# Patient Record
Sex: FEMALE | Race: WHITE | Employment: OTHER | URBAN - METROPOLITAN AREA
[De-identification: names, ages, dates, MRNs, and addresses within clinical notes are randomized per-mention and may not be internally consistent; named-entity substitution may affect disease eponyms.]

---

## 2017-02-08 ENCOUNTER — OFFICE VISIT (OUTPATIENT)
Dept: INTERNAL MEDICINE CLINIC | Age: 62
End: 2017-02-08

## 2017-02-08 VITALS
DIASTOLIC BLOOD PRESSURE: 84 MMHG | SYSTOLIC BLOOD PRESSURE: 130 MMHG | BODY MASS INDEX: 38.62 KG/M2 | HEART RATE: 72 BPM | WEIGHT: 218 LBS | HEIGHT: 63 IN

## 2017-02-08 DIAGNOSIS — Z23 NEED FOR PROPHYLACTIC VACCINATION AND INOCULATION AGAINST VARICELLA: ICD-10-CM

## 2017-02-08 DIAGNOSIS — Z79.899 HIGH RISK MEDICATION USE: ICD-10-CM

## 2017-02-08 DIAGNOSIS — E66.01 SEVERE OBESITY (BMI 35.0-39.9): ICD-10-CM

## 2017-02-08 DIAGNOSIS — F41.1 GAD (GENERALIZED ANXIETY DISORDER): ICD-10-CM

## 2017-02-08 DIAGNOSIS — Z11.4 SCREENING FOR HIV WITHOUT PRESENCE OF RISK FACTORS: ICD-10-CM

## 2017-02-08 DIAGNOSIS — Z00.00 PREVENTATIVE HEALTH CARE: Primary | ICD-10-CM

## 2017-02-08 DIAGNOSIS — I10 ESSENTIAL HYPERTENSION: ICD-10-CM

## 2017-02-08 DIAGNOSIS — K21.9 GASTROESOPHAGEAL REFLUX DISEASE, ESOPHAGITIS PRESENCE NOT SPECIFIED: ICD-10-CM

## 2017-02-08 DIAGNOSIS — Z11.59 NEED FOR HEPATITIS C SCREENING TEST: ICD-10-CM

## 2017-02-08 DIAGNOSIS — Z85.3 HISTORY OF CANCER OF RIGHT BREAST: ICD-10-CM

## 2017-02-08 LAB
A/G RATIO: 1.5 (ref 1.1–2.2)
ALBUMIN SERPL-MCNC: 3.7 G/DL (ref 3.4–5)
ALP BLD-CCNC: 86 U/L (ref 40–129)
ALT SERPL-CCNC: 23 U/L (ref 10–40)
ANION GAP SERPL CALCULATED.3IONS-SCNC: 12 MMOL/L (ref 3–16)
AST SERPL-CCNC: 22 U/L (ref 15–37)
BASOPHILS ABSOLUTE: 0.1 K/UL (ref 0–0.2)
BASOPHILS RELATIVE PERCENT: 1.1 %
BILIRUB SERPL-MCNC: 0.5 MG/DL (ref 0–1)
BUN BLDV-MCNC: 12 MG/DL (ref 7–20)
CALCIUM SERPL-MCNC: 8.9 MG/DL (ref 8.3–10.6)
CHLORIDE BLD-SCNC: 98 MMOL/L (ref 99–110)
CHOLESTEROL, TOTAL: 175 MG/DL (ref 0–199)
CO2: 31 MMOL/L (ref 21–32)
CREAT SERPL-MCNC: 0.9 MG/DL (ref 0.6–1.2)
EOSINOPHILS ABSOLUTE: 0.2 K/UL (ref 0–0.6)
EOSINOPHILS RELATIVE PERCENT: 3.2 %
GFR AFRICAN AMERICAN: >60
GFR NON-AFRICAN AMERICAN: >60
GLOBULIN: 2.5 G/DL
GLUCOSE BLD-MCNC: 99 MG/DL (ref 70–99)
HCT VFR BLD CALC: 43.4 % (ref 36–48)
HDLC SERPL-MCNC: 47 MG/DL (ref 40–60)
HEMOGLOBIN: 14.4 G/DL (ref 12–16)
HEPATITIS C ANTIBODY INTERPRETATION: NORMAL
LDL CHOLESTEROL CALCULATED: 106 MG/DL
LYMPHOCYTES ABSOLUTE: 2.7 K/UL (ref 1–5.1)
LYMPHOCYTES RELATIVE PERCENT: 39.3 %
MAGNESIUM: 2.1 MG/DL (ref 1.8–2.4)
MCH RBC QN AUTO: 28.6 PG (ref 26–34)
MCHC RBC AUTO-ENTMCNC: 33.3 G/DL (ref 31–36)
MCV RBC AUTO: 86 FL (ref 80–100)
MONOCYTES ABSOLUTE: 0.9 K/UL (ref 0–1.3)
MONOCYTES RELATIVE PERCENT: 12.6 %
NEUTROPHILS ABSOLUTE: 3 K/UL (ref 1.7–7.7)
NEUTROPHILS RELATIVE PERCENT: 43.8 %
PDW BLD-RTO: 13.3 % (ref 12.4–15.4)
PLATELET # BLD: 208 K/UL (ref 135–450)
PMV BLD AUTO: 8.4 FL (ref 5–10.5)
POTASSIUM SERPL-SCNC: 3.8 MMOL/L (ref 3.5–5.1)
RBC # BLD: 5.05 M/UL (ref 4–5.2)
SODIUM BLD-SCNC: 141 MMOL/L (ref 136–145)
TOTAL PROTEIN: 6.2 G/DL (ref 6.4–8.2)
TRIGL SERPL-MCNC: 110 MG/DL (ref 0–150)
TSH REFLEX: 2.71 UIU/ML (ref 0.27–4.2)
VITAMIN B-12: 602 PG/ML (ref 211–911)
VLDLC SERPL CALC-MCNC: 22 MG/DL
WBC # BLD: 6.9 K/UL (ref 4–11)

## 2017-02-08 PROCEDURE — 99396 PREV VISIT EST AGE 40-64: CPT | Performed by: FAMILY MEDICINE

## 2017-02-08 RX ORDER — RANITIDINE 150 MG/1
TABLET ORAL
Qty: 90 TABLET | Refills: 3 | Status: SHIPPED | OUTPATIENT
Start: 2017-02-08 | End: 2017-05-10 | Stop reason: SDUPTHER

## 2017-02-08 RX ORDER — ESCITALOPRAM OXALATE 20 MG/1
TABLET ORAL
Qty: 90 TABLET | Refills: 1 | Status: SHIPPED | OUTPATIENT
Start: 2017-02-08 | End: 2017-05-10 | Stop reason: SDUPTHER

## 2017-02-08 RX ORDER — LOSARTAN POTASSIUM AND HYDROCHLOROTHIAZIDE 25; 100 MG/1; MG/1
TABLET ORAL
Qty: 90 TABLET | Refills: 1 | Status: SHIPPED | OUTPATIENT
Start: 2017-02-08 | End: 2017-07-28 | Stop reason: SDUPTHER

## 2017-02-08 RX ORDER — PANTOPRAZOLE SODIUM 40 MG/1
TABLET, DELAYED RELEASE ORAL
Qty: 90 TABLET | Refills: 1 | Status: SHIPPED | OUTPATIENT
Start: 2017-02-08 | End: 2017-07-06 | Stop reason: SDUPTHER

## 2017-02-09 LAB — HIV-1 AND HIV-2 ANTIBODIES: NORMAL

## 2017-03-06 ENCOUNTER — HOSPITAL ENCOUNTER (OUTPATIENT)
Dept: MAMMOGRAPHY | Age: 62
Discharge: HOME | End: 2017-03-06
Attending: FAMILY MEDICINE | Admitting: FAMILY MEDICINE

## 2017-03-06 DIAGNOSIS — Z85.3 PERSONAL HISTORY OF MALIGNANT NEOPLASM OF BREAST: ICD-10-CM

## 2017-03-06 DIAGNOSIS — Z00.00 ENCOUNTER FOR GENERAL ADULT MEDICAL EXAMINATION WITHOUT ABNORMAL FINDINGS: ICD-10-CM

## 2017-05-10 DIAGNOSIS — K21.9 GASTROESOPHAGEAL REFLUX DISEASE, ESOPHAGITIS PRESENCE NOT SPECIFIED: ICD-10-CM

## 2017-05-10 DIAGNOSIS — F41.1 GAD (GENERALIZED ANXIETY DISORDER): ICD-10-CM

## 2017-05-10 RX ORDER — RANITIDINE 150 MG/1
TABLET ORAL
Qty: 90 TABLET | Refills: 3 | Status: SHIPPED | OUTPATIENT
Start: 2017-05-10 | End: 2018-04-11 | Stop reason: SDUPTHER

## 2017-05-10 RX ORDER — ESCITALOPRAM OXALATE 20 MG/1
TABLET ORAL
Qty: 90 TABLET | Refills: 1 | Status: SHIPPED | OUTPATIENT
Start: 2017-05-10 | End: 2017-11-12 | Stop reason: SDUPTHER

## 2017-07-06 ENCOUNTER — TELEPHONE (OUTPATIENT)
Dept: INTERNAL MEDICINE CLINIC | Age: 62
End: 2017-07-06

## 2017-07-06 DIAGNOSIS — K21.9 GASTROESOPHAGEAL REFLUX DISEASE, ESOPHAGITIS PRESENCE NOT SPECIFIED: ICD-10-CM

## 2017-07-06 RX ORDER — PANTOPRAZOLE SODIUM 40 MG/1
TABLET, DELAYED RELEASE ORAL
Qty: 30 TABLET | Refills: 5 | Status: SHIPPED | OUTPATIENT
Start: 2017-07-06 | End: 2017-08-09 | Stop reason: SDUPTHER

## 2017-07-28 DIAGNOSIS — I10 ESSENTIAL HYPERTENSION: ICD-10-CM

## 2017-07-28 RX ORDER — LOSARTAN POTASSIUM AND HYDROCHLOROTHIAZIDE 25; 100 MG/1; MG/1
TABLET ORAL
Qty: 90 TABLET | Refills: 1 | Status: SHIPPED | OUTPATIENT
Start: 2017-07-28 | End: 2018-02-01 | Stop reason: SDUPTHER

## 2017-08-09 ENCOUNTER — OFFICE VISIT (OUTPATIENT)
Dept: INTERNAL MEDICINE CLINIC | Age: 62
End: 2017-08-09

## 2017-08-09 VITALS
SYSTOLIC BLOOD PRESSURE: 130 MMHG | BODY MASS INDEX: 38.27 KG/M2 | HEIGHT: 63 IN | WEIGHT: 216 LBS | HEART RATE: 80 BPM | DIASTOLIC BLOOD PRESSURE: 82 MMHG

## 2017-08-09 DIAGNOSIS — E66.01 SEVERE OBESITY (BMI 35.0-39.9): ICD-10-CM

## 2017-08-09 DIAGNOSIS — F41.1 GAD (GENERALIZED ANXIETY DISORDER): ICD-10-CM

## 2017-08-09 DIAGNOSIS — I10 ESSENTIAL HYPERTENSION: Primary | ICD-10-CM

## 2017-08-09 DIAGNOSIS — K21.9 GASTROESOPHAGEAL REFLUX DISEASE, ESOPHAGITIS PRESENCE NOT SPECIFIED: ICD-10-CM

## 2017-08-09 PROCEDURE — 99214 OFFICE O/P EST MOD 30 MIN: CPT | Performed by: FAMILY MEDICINE

## 2017-08-09 RX ORDER — PANTOPRAZOLE SODIUM 40 MG/1
TABLET, DELAYED RELEASE ORAL
Qty: 90 TABLET | Refills: 5 | Status: SHIPPED | OUTPATIENT
Start: 2017-08-09 | End: 2018-04-11 | Stop reason: SDUPTHER

## 2017-08-09 ASSESSMENT — ENCOUNTER SYMPTOMS
SHORTNESS OF BREATH: 0
DIARRHEA: 0
CONSTIPATION: 0
ABDOMINAL DISTENTION: 1
WHEEZING: 0
COUGH: 0
CHEST TIGHTNESS: 0

## 2017-08-09 ASSESSMENT — PATIENT HEALTH QUESTIONNAIRE - PHQ9
SUM OF ALL RESPONSES TO PHQ QUESTIONS 1-9: 0
2. FEELING DOWN, DEPRESSED OR HOPELESS: 0
1. LITTLE INTEREST OR PLEASURE IN DOING THINGS: 0
SUM OF ALL RESPONSES TO PHQ9 QUESTIONS 1 & 2: 0

## 2017-11-12 DIAGNOSIS — F41.1 GAD (GENERALIZED ANXIETY DISORDER): ICD-10-CM

## 2017-11-13 RX ORDER — ESCITALOPRAM OXALATE 20 MG/1
TABLET ORAL
Qty: 30 TABLET | Refills: 0 | Status: SHIPPED | OUTPATIENT
Start: 2017-11-13 | End: 2017-12-17 | Stop reason: SDUPTHER

## 2017-12-17 DIAGNOSIS — F41.1 GAD (GENERALIZED ANXIETY DISORDER): ICD-10-CM

## 2017-12-18 RX ORDER — ESCITALOPRAM OXALATE 20 MG/1
TABLET ORAL
Qty: 30 TABLET | Refills: 0 | Status: SHIPPED | OUTPATIENT
Start: 2017-12-18 | End: 2018-01-18 | Stop reason: SDUPTHER

## 2018-01-18 DIAGNOSIS — F41.1 GAD (GENERALIZED ANXIETY DISORDER): ICD-10-CM

## 2018-01-18 RX ORDER — ESCITALOPRAM OXALATE 20 MG/1
TABLET ORAL
Qty: 30 TABLET | Refills: 0 | Status: SHIPPED | OUTPATIENT
Start: 2018-01-18 | End: 2018-02-14 | Stop reason: SDUPTHER

## 2018-02-01 DIAGNOSIS — I10 ESSENTIAL HYPERTENSION: ICD-10-CM

## 2018-02-01 RX ORDER — LOSARTAN POTASSIUM AND HYDROCHLOROTHIAZIDE 25; 100 MG/1; MG/1
TABLET ORAL
Qty: 30 TABLET | Refills: 0 | Status: SHIPPED | OUTPATIENT
Start: 2018-02-01 | End: 2018-04-11 | Stop reason: SDUPTHER

## 2018-03-19 DIAGNOSIS — F41.1 GAD (GENERALIZED ANXIETY DISORDER): ICD-10-CM

## 2018-03-19 RX ORDER — ESCITALOPRAM OXALATE 20 MG/1
TABLET ORAL
Qty: 30 TABLET | Refills: 0 | Status: SHIPPED | OUTPATIENT
Start: 2018-03-19 | End: 2018-04-11 | Stop reason: SDUPTHER

## 2018-04-11 ENCOUNTER — OFFICE VISIT (OUTPATIENT)
Dept: INTERNAL MEDICINE CLINIC | Age: 63
End: 2018-04-11

## 2018-04-11 VITALS
SYSTOLIC BLOOD PRESSURE: 126 MMHG | HEART RATE: 64 BPM | HEIGHT: 63 IN | DIASTOLIC BLOOD PRESSURE: 84 MMHG | WEIGHT: 219 LBS | BODY MASS INDEX: 38.8 KG/M2

## 2018-04-11 DIAGNOSIS — F41.1 GAD (GENERALIZED ANXIETY DISORDER): ICD-10-CM

## 2018-04-11 DIAGNOSIS — Z85.3 HISTORY OF CANCER OF RIGHT BREAST: ICD-10-CM

## 2018-04-11 DIAGNOSIS — Z00.00 ROUTINE PHYSICAL EXAMINATION: Primary | ICD-10-CM

## 2018-04-11 DIAGNOSIS — K21.9 GASTROESOPHAGEAL REFLUX DISEASE, ESOPHAGITIS PRESENCE NOT SPECIFIED: ICD-10-CM

## 2018-04-11 DIAGNOSIS — Z12.31 SCREENING MAMMOGRAM, ENCOUNTER FOR: ICD-10-CM

## 2018-04-11 DIAGNOSIS — Z79.899 HIGH RISK MEDICATION USE: ICD-10-CM

## 2018-04-11 DIAGNOSIS — I10 ESSENTIAL HYPERTENSION: ICD-10-CM

## 2018-04-11 DIAGNOSIS — Z23 NEED FOR SHINGLES VACCINE: ICD-10-CM

## 2018-04-11 DIAGNOSIS — Z87.891 PERSONAL HISTORY OF TOBACCO USE: ICD-10-CM

## 2018-04-11 LAB
A/G RATIO: 1.6 (ref 1.1–2.2)
ALBUMIN SERPL-MCNC: 4.1 G/DL (ref 3.4–5)
ALP BLD-CCNC: 88 U/L (ref 40–129)
ALT SERPL-CCNC: 20 U/L (ref 10–40)
ANION GAP SERPL CALCULATED.3IONS-SCNC: 13 MMOL/L (ref 3–16)
AST SERPL-CCNC: 19 U/L (ref 15–37)
BASOPHILS ABSOLUTE: 0 K/UL (ref 0–0.2)
BASOPHILS RELATIVE PERCENT: 0.6 %
BILIRUB SERPL-MCNC: 0.6 MG/DL (ref 0–1)
BUN BLDV-MCNC: 16 MG/DL (ref 7–20)
CALCIUM SERPL-MCNC: 9.2 MG/DL (ref 8.3–10.6)
CHLORIDE BLD-SCNC: 95 MMOL/L (ref 99–110)
CHOLESTEROL, TOTAL: 188 MG/DL (ref 0–199)
CO2: 31 MMOL/L (ref 21–32)
CREAT SERPL-MCNC: 0.8 MG/DL (ref 0.6–1.2)
EOSINOPHILS ABSOLUTE: 0.2 K/UL (ref 0–0.6)
EOSINOPHILS RELATIVE PERCENT: 3.4 %
GFR AFRICAN AMERICAN: >60
GFR NON-AFRICAN AMERICAN: >60
GLOBULIN: 2.5 G/DL
GLUCOSE BLD-MCNC: 101 MG/DL (ref 70–99)
HCT VFR BLD CALC: 45.6 % (ref 36–48)
HDLC SERPL-MCNC: 56 MG/DL (ref 40–60)
HEMOGLOBIN: 15.4 G/DL (ref 12–16)
LDL CHOLESTEROL CALCULATED: 110 MG/DL
LYMPHOCYTES ABSOLUTE: 3 K/UL (ref 1–5.1)
LYMPHOCYTES RELATIVE PERCENT: 41.9 %
MAGNESIUM: 2.1 MG/DL (ref 1.8–2.4)
MCH RBC QN AUTO: 29.8 PG (ref 26–34)
MCHC RBC AUTO-ENTMCNC: 33.9 G/DL (ref 31–36)
MCV RBC AUTO: 88 FL (ref 80–100)
MONOCYTES ABSOLUTE: 0.7 K/UL (ref 0–1.3)
MONOCYTES RELATIVE PERCENT: 9.5 %
NEUTROPHILS ABSOLUTE: 3.2 K/UL (ref 1.7–7.7)
NEUTROPHILS RELATIVE PERCENT: 44.6 %
PDW BLD-RTO: 13.4 % (ref 12.4–15.4)
PLATELET # BLD: 269 K/UL (ref 135–450)
PMV BLD AUTO: 8.3 FL (ref 5–10.5)
POTASSIUM SERPL-SCNC: 4 MMOL/L (ref 3.5–5.1)
RBC # BLD: 5.18 M/UL (ref 4–5.2)
SODIUM BLD-SCNC: 139 MMOL/L (ref 136–145)
TOTAL PROTEIN: 6.6 G/DL (ref 6.4–8.2)
TRIGL SERPL-MCNC: 108 MG/DL (ref 0–150)
TSH REFLEX: 1.07 UIU/ML (ref 0.27–4.2)
VITAMIN B-12: 558 PG/ML (ref 211–911)
VLDLC SERPL CALC-MCNC: 22 MG/DL
WBC # BLD: 7.1 K/UL (ref 4–11)

## 2018-04-11 PROCEDURE — G0296 VISIT TO DETERM LDCT ELIG: HCPCS | Performed by: FAMILY MEDICINE

## 2018-04-11 PROCEDURE — 99396 PREV VISIT EST AGE 40-64: CPT | Performed by: FAMILY MEDICINE

## 2018-04-11 RX ORDER — PANTOPRAZOLE SODIUM 40 MG/1
TABLET, DELAYED RELEASE ORAL
Qty: 90 TABLET | Refills: 5 | Status: SHIPPED | OUTPATIENT
Start: 2018-04-11 | End: 2019-04-13 | Stop reason: SDUPTHER

## 2018-04-11 RX ORDER — LOSARTAN POTASSIUM AND HYDROCHLOROTHIAZIDE 25; 100 MG/1; MG/1
TABLET ORAL
Qty: 90 TABLET | Refills: 2 | Status: SHIPPED | OUTPATIENT
Start: 2018-04-11 | End: 2019-02-02 | Stop reason: SDUPTHER

## 2018-04-11 RX ORDER — RANITIDINE 150 MG/1
TABLET ORAL
Qty: 90 TABLET | Refills: 3 | Status: SHIPPED | OUTPATIENT
Start: 2018-04-11 | End: 2019-04-13 | Stop reason: SDUPTHER

## 2018-04-11 RX ORDER — ESCITALOPRAM OXALATE 20 MG/1
TABLET ORAL
Qty: 90 TABLET | Refills: 2 | Status: SHIPPED | OUTPATIENT
Start: 2018-04-11 | End: 2019-01-17 | Stop reason: SDUPTHER

## 2018-04-11 ASSESSMENT — PATIENT HEALTH QUESTIONNAIRE - PHQ9
SUM OF ALL RESPONSES TO PHQ QUESTIONS 1-9: 0
1. LITTLE INTEREST OR PLEASURE IN DOING THINGS: 0
SUM OF ALL RESPONSES TO PHQ9 QUESTIONS 1 & 2: 0
2. FEELING DOWN, DEPRESSED OR HOPELESS: 0

## 2018-04-17 ENCOUNTER — HOSPITAL ENCOUNTER (OUTPATIENT)
Dept: MAMMOGRAPHY | Age: 63
Discharge: HOME | End: 2018-04-17
Attending: FAMILY MEDICINE | Admitting: FAMILY MEDICINE

## 2018-04-17 DIAGNOSIS — Z12.31 SCREENING MAMMOGRAM, ENCOUNTER FOR: ICD-10-CM

## 2018-04-17 DIAGNOSIS — Z85.3 HISTORY OF CANCER OF RIGHT BREAST: ICD-10-CM

## 2018-05-30 ENCOUNTER — OFFICE VISIT (OUTPATIENT)
Dept: INTERNAL MEDICINE CLINIC | Age: 63
End: 2018-05-30

## 2018-05-30 ENCOUNTER — TELEPHONE (OUTPATIENT)
Dept: INTERNAL MEDICINE CLINIC | Age: 63
End: 2018-05-30

## 2018-05-30 VITALS — SYSTOLIC BLOOD PRESSURE: 126 MMHG | HEIGHT: 63 IN | HEART RATE: 68 BPM | DIASTOLIC BLOOD PRESSURE: 76 MMHG

## 2018-05-30 DIAGNOSIS — L28.2 PAPULAR URTICARIA: ICD-10-CM

## 2018-05-30 DIAGNOSIS — B35.6 TINEA CRURIS: Primary | ICD-10-CM

## 2018-05-30 PROCEDURE — 99213 OFFICE O/P EST LOW 20 MIN: CPT | Performed by: FAMILY MEDICINE

## 2018-05-30 RX ORDER — PREDNISONE 20 MG/1
40 TABLET ORAL DAILY
Qty: 10 TABLET | Refills: 1 | Status: SHIPPED | OUTPATIENT
Start: 2018-05-30 | End: 2018-06-04

## 2018-05-30 RX ORDER — CLOTRIMAZOLE AND BETAMETHASONE DIPROPIONATE 10; .64 MG/G; MG/G
CREAM TOPICAL
Qty: 15 G | Refills: 1 | Status: SHIPPED | OUTPATIENT
Start: 2018-05-30 | End: 2019-08-16 | Stop reason: ALTCHOICE

## 2018-05-30 ASSESSMENT — ENCOUNTER SYMPTOMS
COUGH: 0
WHEEZING: 0
SHORTNESS OF BREATH: 0
STRIDOR: 0

## 2018-06-13 ENCOUNTER — OFFICE VISIT (OUTPATIENT)
Dept: INTERNAL MEDICINE CLINIC | Age: 63
End: 2018-06-13

## 2018-06-13 VITALS
DIASTOLIC BLOOD PRESSURE: 82 MMHG | SYSTOLIC BLOOD PRESSURE: 132 MMHG | WEIGHT: 221 LBS | BODY MASS INDEX: 39.16 KG/M2 | HEART RATE: 76 BPM | HEIGHT: 63 IN

## 2018-06-13 DIAGNOSIS — Z01.419 ENCOUNTER FOR GYNECOLOGICAL EXAMINATION WITHOUT ABNORMAL FINDING: Primary | ICD-10-CM

## 2018-06-13 DIAGNOSIS — Z12.4 SCREENING FOR CERVICAL CANCER: ICD-10-CM

## 2018-06-13 PROCEDURE — 99396 PREV VISIT EST AGE 40-64: CPT | Performed by: FAMILY MEDICINE

## 2018-06-15 LAB
HPV COMMENT: NORMAL
HPV TYPE 16: NOT DETECTED
HPV TYPE 18: NOT DETECTED
HPVOH (OTHER TYPES): NOT DETECTED

## 2018-08-01 ENCOUNTER — OFFICE VISIT (OUTPATIENT)
Dept: INTERNAL MEDICINE CLINIC | Age: 63
End: 2018-08-01

## 2018-08-01 VITALS — DIASTOLIC BLOOD PRESSURE: 84 MMHG | SYSTOLIC BLOOD PRESSURE: 122 MMHG | HEIGHT: 63 IN | HEART RATE: 80 BPM

## 2018-08-01 DIAGNOSIS — L25.9 CONTACT DERMATITIS, UNSPECIFIED CONTACT DERMATITIS TYPE, UNSPECIFIED TRIGGER: Primary | ICD-10-CM

## 2018-08-01 PROCEDURE — 96372 THER/PROPH/DIAG INJ SC/IM: CPT | Performed by: FAMILY MEDICINE

## 2018-08-01 PROCEDURE — 99213 OFFICE O/P EST LOW 20 MIN: CPT | Performed by: FAMILY MEDICINE

## 2018-08-01 RX ORDER — METHYLPREDNISOLONE ACETATE 40 MG/ML
40 INJECTION, SUSPENSION INTRA-ARTICULAR; INTRALESIONAL; INTRAMUSCULAR; SOFT TISSUE ONCE
Status: COMPLETED | OUTPATIENT
Start: 2018-08-01 | End: 2018-08-01

## 2018-08-01 RX ORDER — PREDNISONE 20 MG/1
20 TABLET ORAL 2 TIMES DAILY WITH MEALS
Qty: 6 TABLET | Refills: 0 | Status: SHIPPED | OUTPATIENT
Start: 2018-08-01 | End: 2018-08-04

## 2018-08-01 RX ADMIN — METHYLPREDNISOLONE ACETATE 40 MG: 40 INJECTION, SUSPENSION INTRA-ARTICULAR; INTRALESIONAL; INTRAMUSCULAR; SOFT TISSUE at 16:34

## 2018-08-01 NOTE — PROGRESS NOTES
Subjective:      Patient ID: Roscoe Ayers is a 61 y.o. female. HPI  Chief Complaint   Patient presents with    Rash     Was here at the end of May for rash. That did clear up. Rash appeared again on Monday. Parts of the rash are painful and tingling. Rash on face under eye, chin, forehead and scalp     New c/o:  Rash on her face . Itchy and irritated. Started on July 30, 2 days ago. Not in yard, so doesn't think poison ivy. Normally uses [place] facial cream.  They did change the bottle recently, but does not know if they change the ingredienys. Uses Comuni-Chiamo. Does not wear make up. Shampoo and conditioner:  Dove and no change. Wonders if it is shingles. Had this in the past on the left side of her face. Her rash from May did go away. The prednisone helped, but it caused heartburn with the two-week course. She prefer not to take this type of treatment again if not needed. Review of Systems    Objective:   Physical Exam   Constitutional: She appears well-developed and well-nourished. Cardiovascular: Normal rate, regular rhythm, normal heart sounds and intact distal pulses. Pulmonary/Chest: Effort normal and breath sounds normal.   Skin: Skin is warm and dry. Rash noted. Rash is maculopapular. There is erythema. No pallor. Patches of rash on both sides or the chin, right forehead at the hairline, left infraorbital area extending to the medial canthus. Mostly patchy macules with hint of rough surface that could be a bit papular. Not vesicular  Definitely on both sides of face. Neck, arms, legs, post scalp and trunk all clear. Psychiatric: She has a normal mood and affect. Her behavior is normal. Cognition and memory are normal.   Vitals reviewed. Assessment:      1.  Contact dermatitis, unspecified contact dermatitis type, unspecified trigger  I suspect this is a reaction to her [place] facial lotion or cream.  It sounds like they did change the formula if the bottle looks

## 2018-10-16 ENCOUNTER — HOSPITAL ENCOUNTER (OUTPATIENT)
Age: 63
Discharge: HOME | End: 2018-10-16
Payer: COMMERCIAL

## 2018-10-16 ENCOUNTER — HOSPITAL ENCOUNTER (OUTPATIENT)
Dept: GENERAL RADIOLOGY | Age: 63
Discharge: HOME | End: 2018-10-16
Payer: COMMERCIAL

## 2018-10-16 ENCOUNTER — OFFICE VISIT (OUTPATIENT)
Dept: INTERNAL MEDICINE CLINIC | Age: 63
End: 2018-10-16
Payer: COMMERCIAL

## 2018-10-16 VITALS
HEART RATE: 76 BPM | HEIGHT: 63 IN | DIASTOLIC BLOOD PRESSURE: 84 MMHG | SYSTOLIC BLOOD PRESSURE: 126 MMHG | WEIGHT: 229.2 LBS | BODY MASS INDEX: 40.61 KG/M2

## 2018-10-16 DIAGNOSIS — Z23 NEED FOR INFLUENZA VACCINATION: ICD-10-CM

## 2018-10-16 DIAGNOSIS — M25.561 ACUTE PAIN OF RIGHT KNEE: Primary | ICD-10-CM

## 2018-10-16 DIAGNOSIS — M25.561 ACUTE PAIN OF RIGHT KNEE: ICD-10-CM

## 2018-10-16 PROCEDURE — 90471 IMMUNIZATION ADMIN: CPT | Performed by: NURSE PRACTITIONER

## 2018-10-16 PROCEDURE — 99213 OFFICE O/P EST LOW 20 MIN: CPT | Performed by: NURSE PRACTITIONER

## 2018-10-16 PROCEDURE — 90682 RIV4 VACC RECOMBINANT DNA IM: CPT | Performed by: NURSE PRACTITIONER

## 2018-10-16 PROCEDURE — 96372 THER/PROPH/DIAG INJ SC/IM: CPT | Performed by: NURSE PRACTITIONER

## 2018-10-16 PROCEDURE — 73562 X-RAY EXAM OF KNEE 3: CPT

## 2018-10-16 RX ORDER — KETOROLAC TROMETHAMINE 30 MG/ML
60 INJECTION, SOLUTION INTRAMUSCULAR; INTRAVENOUS ONCE
Status: COMPLETED | OUTPATIENT
Start: 2018-10-16 | End: 2018-10-16

## 2018-10-16 RX ADMIN — KETOROLAC TROMETHAMINE 60 MG: 30 INJECTION, SOLUTION INTRAMUSCULAR; INTRAVENOUS at 13:41

## 2018-10-16 ASSESSMENT — ENCOUNTER SYMPTOMS
COUGH: 0
WHEEZING: 0
BACK PAIN: 0
SHORTNESS OF BREATH: 0
COLOR CHANGE: 0

## 2018-10-16 NOTE — PATIENT INSTRUCTIONS
delayed-release tablet. Swallow it whole. Dissolve diclofenac powder (Cambia) with 1 to 2 ounces of water. Do not use any other type of liquid. Stir this mixture and drink all of it right away. Diclofenac powder works best if you take it on an empty stomach. Call your doctor if your headache does not completely go away after taking Cambia. Do not take a second dose of diclofenac powder without your doctor's advice. Overuse of migraine headache medicine can make headaches worse. Tell your doctor if the medicine seems to stop working as well in treating your migraine attacks. If you use diclofenac long-term, you may need frequent medical tests. Store at room temperature away from moisture and heat. Keep the bottle tightly closed when not in use. Read all patient information, medication guides, and instruction sheets provided to you. Ask your doctor or pharmacist if you have any questions. What happens if I miss a dose? Take the missed dose as soon as you remember. Skip the missed dose if it is almost time for your next scheduled dose. Do not take extra medicine to make up the missed dose. What happens if I overdose? Seek emergency medical attention or call the Poison Help line at 1-221.302.1180. What should I avoid while taking diclofenac? Avoid drinking alcohol. It may increase your risk of stomach bleeding. Avoid taking aspirin or other NSAIDs while you are taking diclofenac. Ask a doctor or pharmacist before using any cold, allergy, or pain medication. Many medicines available over the counter contain aspirin or other medicines similar to diclofenac. Taking certain products together can cause you to get too much of this type of medication. Check the label to see if a medicine contains aspirin, ibuprofen, ketoprofen, or naproxen. What are the possible side effects of diclofenac?   Get emergency medical help if you have signs of an allergic reaction: sneezing, runny or stuffy nose; wheezing or trouble breathing; hives; swelling of your face, lips, tongue, or throat. Get emergency medical help if you have signs of a heart attack or stroke: chest pain spreading to your jaw or shoulder, sudden numbness or weakness on one side of the body, slurred speech, feeling short of breath. Stop using diclofenac and call your doctor at once if you have:  · the first sign of any skin rash, no matter how mild;  · shortness of breath (even with mild exertion); · swelling or rapid weight gain;  · signs of stomach bleeding --bloody or tarry stools, coughing up blood or vomit that looks like coffee grounds;  · liver problems --nausea, upper stomach pain, itching, tired feeling, flu-like symptoms, loss of appetite, dark urine, shelia-colored stools, jaundice (yellowing of the skin or eyes);  · kidney problems --little or no urinating, painful or difficult urination, swelling in your feet or ankles, feeling tired or short of breath;  · high blood pressure --severe headache, pounding in your neck or ears, nosebleed, anxiety, confusion;  · low red blood cells (anemia) --pale skin, feeling light-headed or short of breath, rapid heart rate, trouble concentrating; or  · severe skin reaction --fever, sore throat, swelling in your face or tongue, burning in your eyes, skin pain followed by a red or purple skin rash that spreads (especially in the face or upper body) and causes blistering and peeling. Common side effects may include:  · indigestion, gas, stomach pain, nausea, vomiting;  · diarrhea, constipation;  · headache, dizziness, drowsiness;  · stuffy nose;  · itching, increased sweating;  · increased blood pressure; or  · swelling or pain in your arms or legs. This is not a complete list of side effects and others may occur. Call your doctor for medical advice about side effects. You may report side effects to FDA at 3-794-FDA-2340. What other drugs will affect diclofenac?   Ask your doctor before using diclofenac if you take an

## 2018-10-16 NOTE — PROGRESS NOTES
NIGHTLY 90 tablet 3    Multiple Vitamins-Minerals (OCUVITE) TABS oral tablet Take 1 tablet by mouth daily      calcium-vitamin D (OSCAL-500) 500-200 MG-UNIT per tablet Take 3 tablets by mouth daily. No current facility-administered medications for this visit. Review of Systems   Constitutional: Negative for chills, diaphoresis, fatigue and fever. Respiratory: Negative for cough, shortness of breath and wheezing. Cardiovascular: Negative for chest pain and palpitations. Musculoskeletal: Positive for gait problem (using crutches to ambulate d/t pain). Negative for back pain, myalgias, neck pain and neck stiffness. Right knee pain with ambulation  Right knee swelling  Right foot swelling occasionally   Skin: Negative for color change, pallor, rash and wound. Neurological: Negative for dizziness, facial asymmetry, speech difficulty, light-headedness and headaches. Hematological: Negative for adenopathy. Does not bruise/bleed easily. OBJECTIVE:  /84   Pulse 76   Ht 5' 2.5\" (1.588 m)   Wt 229 lb 3.2 oz (104 kg)   BMI 41.25 kg/m²      Physical Exam   Constitutional: She is oriented to person, place, and time. She appears well-developed and well-nourished. No distress. HENT:   Head: Normocephalic and atraumatic. Cardiovascular: Normal rate, regular rhythm and normal heart sounds. Exam reveals no gallop and no friction rub. No murmur heard. Pulmonary/Chest: Effort normal and breath sounds normal. No respiratory distress. She has no wheezes. She has no rales. She exhibits no tenderness. Musculoskeletal: Normal range of motion. She exhibits no edema, tenderness or deformity. Neurological: She is alert and oriented to person, place, and time. Skin: She is not diaphoretic. Vitals reviewed. Anterior/Posterior Drawer tests- negative  Right knee passive ROM WNL. Pain with right knee active ROM - extension and flexion. No pain with palpation to the right knee.   No swelling noted to the right knee or right foot. Gait- crutches d/t pain    ASSESSMENT/PLAN:  Kimmy Weldon was seen today for knee pain. Diagnoses and all orders for this visit:    Acute pain of right knee  -    No swelling, redness, or warmth noted on exam today. No swelling of the foot noted either. The patient has a brace on her right knee today, as well as utilizing crutches. The pain is on the bottom of her patella, surrounding the right and left sides of her kneecap as well.  - ketorolac (TORADOL) injection 60 mg; Inject 2 mLs into the muscle once  -     XR KNEE RIGHT (3 VIEWS); Future- will call the patient with results. -     diclofenac (VOLTAREN) 50 MG EC tablet; Take 1 tablet by mouth 3 times daily (with meals) As needed- patient education handout provided and reviewed with the patient. The patient was informed that she can pick this up tomorrow. I informed her that no NSAIDs should be taken today as she was given the Toradol.  - Patient's anterior and posterior drawer tests were negative. Range of motion was normal, however the patient had pain with extension and flexion of her right knee. She was using crutches with ambulation.  - Rest, elevation and heat recommended to the patient. Patient is agreeable to plan. Need for influenza vaccination  -     INFLUENZA, QUADV, RECOMBINANT, 18 YRS AND OLDER, IM, PF, PREFILL SYR OR SDV, 0.5ML (FLUBLOK QUADV, PF)- patient education handout provided and reviewed with patient. Pt informed to call if symptoms worsen or fail to improve. All questions answered. Patient states no further questions or concerns at this time.     Electronically signed by SHARDA Aguilera CNP 10/16/18

## 2019-06-16 DIAGNOSIS — K21.9 GASTROESOPHAGEAL REFLUX DISEASE, ESOPHAGITIS PRESENCE NOT SPECIFIED: ICD-10-CM

## 2019-06-17 RX ORDER — PANTOPRAZOLE SODIUM 40 MG/1
TABLET, DELAYED RELEASE ORAL
Qty: 30 TABLET | Refills: 0 | Status: SHIPPED | OUTPATIENT
Start: 2019-06-17 | End: 2019-07-14 | Stop reason: SDUPTHER

## 2019-08-16 ENCOUNTER — OFFICE VISIT (OUTPATIENT)
Dept: INTERNAL MEDICINE CLINIC | Age: 64
End: 2019-08-16

## 2019-08-16 VITALS
HEIGHT: 63 IN | BODY MASS INDEX: 40.4 KG/M2 | WEIGHT: 228 LBS | HEART RATE: 84 BPM | DIASTOLIC BLOOD PRESSURE: 80 MMHG | SYSTOLIC BLOOD PRESSURE: 122 MMHG

## 2019-08-16 DIAGNOSIS — I10 ESSENTIAL HYPERTENSION: ICD-10-CM

## 2019-08-16 DIAGNOSIS — E66.01 OBESITY, CLASS III, BMI 40-49.9 (MORBID OBESITY) (HCC): Primary | ICD-10-CM

## 2019-08-16 DIAGNOSIS — F41.1 GAD (GENERALIZED ANXIETY DISORDER): ICD-10-CM

## 2019-08-16 DIAGNOSIS — Z51.81 MEDICATION MONITORING ENCOUNTER: ICD-10-CM

## 2019-08-16 DIAGNOSIS — K21.9 GASTROESOPHAGEAL REFLUX DISEASE, ESOPHAGITIS PRESENCE NOT SPECIFIED: ICD-10-CM

## 2019-08-16 LAB
A/G RATIO: 1.9 (ref 1.1–2.2)
ALBUMIN SERPL-MCNC: 4.1 G/DL (ref 3.4–5)
ALP BLD-CCNC: 92 U/L (ref 40–129)
ALT SERPL-CCNC: 20 U/L (ref 10–40)
ANION GAP SERPL CALCULATED.3IONS-SCNC: 11 MMOL/L (ref 3–16)
AST SERPL-CCNC: 18 U/L (ref 15–37)
BASOPHILS ABSOLUTE: 0 K/UL (ref 0–0.2)
BASOPHILS RELATIVE PERCENT: 0.6 %
BILIRUB SERPL-MCNC: 0.3 MG/DL (ref 0–1)
BUN BLDV-MCNC: 22 MG/DL (ref 7–20)
CALCIUM SERPL-MCNC: 9.2 MG/DL (ref 8.3–10.6)
CHLORIDE BLD-SCNC: 98 MMOL/L (ref 99–110)
CO2: 32 MMOL/L (ref 21–32)
CREAT SERPL-MCNC: 0.8 MG/DL (ref 0.6–1.2)
EOSINOPHILS ABSOLUTE: 0.2 K/UL (ref 0–0.6)
EOSINOPHILS RELATIVE PERCENT: 3.5 %
ESTIMATED AVERAGE GLUCOSE: 114 MG/DL
GFR AFRICAN AMERICAN: >60
GFR NON-AFRICAN AMERICAN: >60
GLOBULIN: 2.2 G/DL
GLUCOSE BLD-MCNC: 103 MG/DL (ref 70–99)
HBA1C MFR BLD: 5.6 %
HCT VFR BLD CALC: 44.6 % (ref 36–48)
HEMOGLOBIN: 14.6 G/DL (ref 12–16)
LYMPHOCYTES ABSOLUTE: 2.1 K/UL (ref 1–5.1)
LYMPHOCYTES RELATIVE PERCENT: 34.2 %
MCH RBC QN AUTO: 28.9 PG (ref 26–34)
MCHC RBC AUTO-ENTMCNC: 32.8 G/DL (ref 31–36)
MCV RBC AUTO: 88.1 FL (ref 80–100)
MONOCYTES ABSOLUTE: 0.6 K/UL (ref 0–1.3)
MONOCYTES RELATIVE PERCENT: 9.5 %
NEUTROPHILS ABSOLUTE: 3.2 K/UL (ref 1.7–7.7)
NEUTROPHILS RELATIVE PERCENT: 52.2 %
PDW BLD-RTO: 13.5 % (ref 12.4–15.4)
PLATELET # BLD: 240 K/UL (ref 135–450)
PMV BLD AUTO: 8.1 FL (ref 5–10.5)
POTASSIUM SERPL-SCNC: 4 MMOL/L (ref 3.5–5.1)
RBC # BLD: 5.06 M/UL (ref 4–5.2)
SODIUM BLD-SCNC: 141 MMOL/L (ref 136–145)
TOTAL PROTEIN: 6.3 G/DL (ref 6.4–8.2)
TSH REFLEX: 1.88 UIU/ML (ref 0.27–4.2)
WBC # BLD: 6.1 K/UL (ref 4–11)

## 2019-08-16 PROCEDURE — 99214 OFFICE O/P EST MOD 30 MIN: CPT | Performed by: FAMILY MEDICINE

## 2019-08-16 RX ORDER — RANITIDINE 150 MG/1
150 TABLET ORAL NIGHTLY
Qty: 90 TABLET | Refills: 2 | Status: SHIPPED | OUTPATIENT
Start: 2019-08-16 | End: 2020-06-18

## 2019-08-16 RX ORDER — LOSARTAN POTASSIUM AND HYDROCHLOROTHIAZIDE 25; 100 MG/1; MG/1
TABLET ORAL
Qty: 90 TABLET | Refills: 2 | Status: SHIPPED | OUTPATIENT
Start: 2019-08-16 | End: 2020-06-18 | Stop reason: SDUPTHER

## 2019-08-16 RX ORDER — PANTOPRAZOLE SODIUM 40 MG/1
40 TABLET, DELAYED RELEASE ORAL DAILY
Qty: 90 TABLET | Refills: 2 | Status: SHIPPED | OUTPATIENT
Start: 2019-08-16 | End: 2020-05-26

## 2019-08-16 RX ORDER — ESCITALOPRAM OXALATE 20 MG/1
TABLET ORAL
Qty: 90 TABLET | Refills: 2 | Status: SHIPPED | OUTPATIENT
Start: 2019-08-16 | End: 2020-05-11

## 2019-08-16 ASSESSMENT — ENCOUNTER SYMPTOMS
WHEEZING: 0
CHEST TIGHTNESS: 0
COUGH: 0
SHORTNESS OF BREATH: 0
ABDOMINAL PAIN: 0

## 2019-08-16 ASSESSMENT — PATIENT HEALTH QUESTIONNAIRE - PHQ9
2. FEELING DOWN, DEPRESSED OR HOPELESS: 0
1. LITTLE INTEREST OR PLEASURE IN DOING THINGS: 0
SUM OF ALL RESPONSES TO PHQ QUESTIONS 1-9: 0
SUM OF ALL RESPONSES TO PHQ QUESTIONS 1-9: 0
SUM OF ALL RESPONSES TO PHQ9 QUESTIONS 1 & 2: 0

## 2019-11-22 DIAGNOSIS — R05.9 COUGH: Primary | ICD-10-CM

## 2019-11-22 RX ORDER — AZITHROMYCIN 250 MG/1
250 TABLET, FILM COATED ORAL SEE ADMIN INSTRUCTIONS
Qty: 6 TABLET | Refills: 0 | Status: SHIPPED | OUTPATIENT
Start: 2019-11-22 | End: 2019-11-27

## 2020-05-29 ENCOUNTER — TELEPHONE (OUTPATIENT)
Dept: INTERNAL MEDICINE CLINIC | Age: 65
End: 2020-05-29

## 2020-06-12 RX ORDER — ESCITALOPRAM OXALATE 20 MG/1
TABLET ORAL
Qty: 30 TABLET | Refills: 0 | Status: SHIPPED | OUTPATIENT
Start: 2020-06-12 | End: 2020-06-18 | Stop reason: SDUPTHER

## 2020-06-18 ENCOUNTER — OFFICE VISIT (OUTPATIENT)
Dept: INTERNAL MEDICINE CLINIC | Age: 65
End: 2020-06-18
Payer: MEDICARE

## 2020-06-18 VITALS
HEIGHT: 63 IN | OXYGEN SATURATION: 98 % | DIASTOLIC BLOOD PRESSURE: 88 MMHG | TEMPERATURE: 98.4 F | BODY MASS INDEX: 42.28 KG/M2 | SYSTOLIC BLOOD PRESSURE: 132 MMHG | HEART RATE: 97 BPM | WEIGHT: 238.6 LBS

## 2020-06-18 PROCEDURE — G0296 VISIT TO DETERM LDCT ELIG: HCPCS | Performed by: FAMILY MEDICINE

## 2020-06-18 PROCEDURE — G0402 INITIAL PREVENTIVE EXAM: HCPCS | Performed by: FAMILY MEDICINE

## 2020-06-18 PROCEDURE — 3017F COLORECTAL CA SCREEN DOC REV: CPT | Performed by: FAMILY MEDICINE

## 2020-06-18 PROCEDURE — 4040F PNEUMOC VAC/ADMIN/RCVD: CPT | Performed by: FAMILY MEDICINE

## 2020-06-18 PROCEDURE — 1123F ACP DISCUSS/DSCN MKR DOCD: CPT | Performed by: FAMILY MEDICINE

## 2020-06-18 RX ORDER — LOSARTAN POTASSIUM AND HYDROCHLOROTHIAZIDE 25; 100 MG/1; MG/1
TABLET ORAL
Qty: 90 TABLET | Refills: 1 | Status: SHIPPED | OUTPATIENT
Start: 2020-06-18 | End: 2020-12-28

## 2020-06-18 RX ORDER — PANTOPRAZOLE SODIUM 40 MG/1
TABLET, DELAYED RELEASE ORAL
Qty: 90 TABLET | Refills: 1 | Status: SHIPPED | OUTPATIENT
Start: 2020-06-18 | End: 2020-12-28

## 2020-06-18 RX ORDER — ESCITALOPRAM OXALATE 20 MG/1
TABLET ORAL
Qty: 90 TABLET | Refills: 1 | Status: SHIPPED | OUTPATIENT
Start: 2020-06-18 | End: 2021-01-12

## 2020-06-18 RX ORDER — FAMOTIDINE 20 MG/1
TABLET, FILM COATED ORAL
Qty: 60 TABLET | COMMUNITY
Start: 2020-06-18 | End: 2021-08-05 | Stop reason: SDUPTHER

## 2020-06-18 ASSESSMENT — LIFESTYLE VARIABLES
HOW OFTEN DURING THE LAST YEAR HAVE YOU FOUND THAT YOU WERE NOT ABLE TO STOP DRINKING ONCE YOU HAD STARTED: 0
AUDIT-C TOTAL SCORE: 1
HOW MANY STANDARD DRINKS CONTAINING ALCOHOL DO YOU HAVE ON A TYPICAL DAY: 0
HOW OFTEN DO YOU HAVE SIX OR MORE DRINKS ON ONE OCCASION: 0
HAVE YOU OR SOMEONE ELSE BEEN INJURED AS A RESULT OF YOUR DRINKING: 0
HOW OFTEN DO YOU HAVE A DRINK CONTAINING ALCOHOL: 1
HAS A RELATIVE, FRIEND, DOCTOR, OR ANOTHER HEALTH PROFESSIONAL EXPRESSED CONCERN ABOUT YOUR DRINKING OR SUGGESTED YOU CUT DOWN: 0
HOW OFTEN DURING THE LAST YEAR HAVE YOU NEEDED AN ALCOHOLIC DRINK FIRST THING IN THE MORNING TO GET YOURSELF GOING AFTER A NIGHT OF HEAVY DRINKING: 0
HOW OFTEN DURING THE LAST YEAR HAVE YOU BEEN UNABLE TO REMEMBER WHAT HAPPENED THE NIGHT BEFORE BECAUSE YOU HAD BEEN DRINKING: 0
HOW OFTEN DURING THE LAST YEAR HAVE YOU FAILED TO DO WHAT WAS NORMALLY EXPECTED FROM YOU BECAUSE OF DRINKING: 0
HOW OFTEN DURING THE LAST YEAR HAVE YOU HAD A FEELING OF GUILT OR REMORSE AFTER DRINKING: 0
AUDIT TOTAL SCORE: 1

## 2020-06-18 ASSESSMENT — VISUAL ACUITY
OD_CC: 20/20
OS_CC: 20/20

## 2020-06-18 ASSESSMENT — PATIENT HEALTH QUESTIONNAIRE - PHQ9
SUM OF ALL RESPONSES TO PHQ QUESTIONS 1-9: 0
SUM OF ALL RESPONSES TO PHQ QUESTIONS 1-9: 0

## 2020-06-18 NOTE — PATIENT INSTRUCTIONS
[place] Flax seed crackers  (expensive)    Check with daughter to make sure Pneumovax. (Prevnar 13 is other pneumonia vaccine and you will need booster in 1 year vs. 5 years for a Pneumovax since this was give before you were 65)    Please mail, fax ([phone]) or drop off a copy of your Advance Directives = Living Will and [address] 12. Learning About Cutting Calories  How do calories affect your weight? Food gives your body energy. Energy from the food you eat is measured in calories. This energy keeps your heart beating, your brain active, and your muscles working. Your body needs a certain number of calories each day. After your body uses the calories it needs, it stores extra calories as fat. To lose weight safely, you have to eat fewer calories while eating in a healthy way. How many calories do you need each day? The more active you are, the more calories you need. When you are less active, you need fewer calories. How many calories you need each day also depends on several things, including your age and whether you are male or female. Here are some general guidelines for adults:  · Less active women and older adults need 1,600 to 2,000 calories each day. · Active women and less active men need 2,000 to 2,400 calories each day. · Active men need 2,400 to 3,000 calories each day. How can you cut calories and eat healthy meals? Whole grains, vegetables and fruits, and dried beans are good lower-calorie foods. They give you lots of nutrients and fiber. And they fill you up. Sweets, energy drinks, and soda pop are high in calories. They give you few nutrients and no fiber. Try to limit soda pop, fruit juice, and energy drinks. Drink water instead. Some fats can be part of a healthy diet. But cutting back on fats from highly processed foods like fast foods and many snack foods is a good way to lower the calories in your diet.  Also, use smaller amounts of fats like low-dose CT scans were less likely to die of lung cancer than those who were screened with standard X-ray. Below is a summary of the things you need to know regarding screening for lung cancer with low-dose computed tomography (LDCT). This is a screening program that involves routine annual screening with LDCT studies of the lung. The LDCTs are done using low-dose radiation that is not thought to increase your cancer risk. If you have other serious medical conditions (other cancers, congestive heart failure) that limit your life expectancy to less than 10 years, you should not undergo lung cancer screening with LDCT. The chance is 20%-60% that the LDCT result will show abnormalities. This would require additional testing which could include repeat imaging or even invasive procedures. Most (about 95%) of \"abnormal\" LDCT results are false in the sense that no lung cancer is ultimately found. Additionally, some (about 10%) of the cancers found would not affect your life expectancy, even if undetected and untreated. If you are still smoking, the single most important thing that you can do to reduce your risk of dying of lung cancer is to quit. For this screening to be covered by Medicare and most other insurers, strict criteria must be met. If you do not meet these criteria, but still wish to undergo LDCT testing, you will be required to sign a waiver indicating your willingness to pay for the scan. Personalized Preventive Plan for [facility] - 6/18/2020  Medicare offers a range of preventive health benefits. Some of the tests and screenings are paid in full while other may be subject to a deductible, co-insurance, and/or copay. Some of these benefits include a comprehensive review of your medical history including lifestyle, illnesses that may run in your family, and various assessments and screenings as appropriate.     After reviewing your medical record and screening and assessments performed today your provider may have ordered immunizations, labs, imaging, and/or referrals for you. A list of these orders (if applicable) as well as your Preventive Care list are included within your After Visit Summary for your review. Other Preventive Recommendations:    · A preventive eye exam performed by an eye specialist is recommended every 1-2 years to screen for glaucoma; cataracts, macular degeneration, and other eye disorders. · A preventive dental visit is recommended every 6 months. · Try to get at least 150 minutes of exercise per week or 10,000 steps per day on a pedometer . · Order or download the FREE \"Exercise & Physical Activity: Your Everyday Guide\" from [employer]. Call 6-909.179.5703 or search [employer] online. · You need 9667-3151 mg of calcium and 6368-2366 IU of vitamin D per day. It is possible to meet your calcium requirement with diet alone, but a vitamin D supplement is usually necessary to meet this goal.  · When exposed to the sun, use a sunscreen that protects against both UVA and UVB radiation with an SPF of 30 or greater. Reapply every 2 to 3 hours or after sweating, drying off with a towel, or swimming. · Always wear a seat belt when traveling in a car. Always wear a helmet when riding a bicycle or motorcycle.

## 2020-06-18 NOTE — PROGRESS NOTES
values have been reviewed and are found in Flowsheets. The following problems were reviewed today and where indicated follow up appointments were made and/or referrals ordered. Positive Risk Factor Screenings with Interventions:     Health Habits/Nutrition:  Health Habits/Nutrition  Do you exercise for at least 20 minutes 2-3 times per week?: Yes  Have you lost any weight without trying in the past 3 months?: No  Do you eat fewer than 2 meals per day?: No  Have you seen a dentist within the past year?: (!) No  Body mass index is 42.27 kg/m². Health Habits/Nutrition Interventions:  · Dental exam overdue:  patient encouraged to make appointment with his/her dentist    Hearing/Vision:  No exam data present  Hearing/Vision  Do you or your family notice any trouble with your hearing?: (!) Yes  Do you have difficulty driving, watching TV, or doing any of your daily activities because of your eyesight?: No  Have you had an eye exam within the past year?: (!) No  Hearing/Vision Interventions:  · Hearing concerns:  patient declines any further evaluation/treatment for hearing issues   · Encouraged to make appt with eye doctor.     Safety:  Safety  Do you have working smoke detectors?: Yes  Have all throw rugs been removed or fastened?: (!) No  Do you have non-slip mats or surfaces in all bathtubs/showers?: Yes  Do all of your stairways have a railing or banister?: Yes  Are your doorways, halls and stairs free of clutter?: Yes  Do you always fasten your seatbelt when you are in a car?: Yes  Safety Interventions:  · Home safety tips provided    Personalized Preventive Plan   Current Health Maintenance Status  Immunization History   Administered Date(s) Administered    Influenza Virus Vaccine 10/18/2013    Influenza Whole 11/22/2009    Influenza, Quadv, Recombinant, IM PF (Flublok 18 yrs and older) 10/16/2018    Tdap (Boostrix, Adacel) 01/04/2013        Health Maintenance   Topic Date Due    Shingles Vaccine (1 of 2)

## 2020-06-24 DIAGNOSIS — F41.1 GAD (GENERALIZED ANXIETY DISORDER): ICD-10-CM

## 2020-06-24 DIAGNOSIS — E66.01 MORBID OBESITY (HCC): ICD-10-CM

## 2020-06-24 DIAGNOSIS — I10 ESSENTIAL HYPERTENSION: ICD-10-CM

## 2020-06-24 DIAGNOSIS — R73.9 HYPERGLYCEMIA: ICD-10-CM

## 2020-06-24 DIAGNOSIS — K21.9 GASTROESOPHAGEAL REFLUX DISEASE, ESOPHAGITIS PRESENCE NOT SPECIFIED: ICD-10-CM

## 2020-06-24 LAB
A/G RATIO: 1.8 (ref 1.1–2.2)
ALBUMIN SERPL-MCNC: 3.9 G/DL (ref 3.4–5)
ALP BLD-CCNC: 98 U/L (ref 40–129)
ALT SERPL-CCNC: 18 U/L (ref 10–40)
ANION GAP SERPL CALCULATED.3IONS-SCNC: 12 MMOL/L (ref 3–16)
AST SERPL-CCNC: 17 U/L (ref 15–37)
BASOPHILS ABSOLUTE: 0 K/UL (ref 0–0.2)
BASOPHILS RELATIVE PERCENT: 0.6 %
BILIRUB SERPL-MCNC: 0.5 MG/DL (ref 0–1)
BUN BLDV-MCNC: 17 MG/DL (ref 7–20)
CALCIUM SERPL-MCNC: 9 MG/DL (ref 8.3–10.6)
CHLORIDE BLD-SCNC: 96 MMOL/L (ref 99–110)
CHOLESTEROL, TOTAL: 168 MG/DL (ref 0–199)
CO2: 30 MMOL/L (ref 21–32)
CREAT SERPL-MCNC: 0.8 MG/DL (ref 0.6–1.2)
EOSINOPHILS ABSOLUTE: 0.3 K/UL (ref 0–0.6)
EOSINOPHILS RELATIVE PERCENT: 3.8 %
GFR AFRICAN AMERICAN: >60
GFR NON-AFRICAN AMERICAN: >60
GLOBULIN: 2.2 G/DL
GLUCOSE BLD-MCNC: 91 MG/DL (ref 70–99)
HCT VFR BLD CALC: 44.7 % (ref 36–48)
HDLC SERPL-MCNC: 51 MG/DL (ref 40–60)
HEMOGLOBIN: 14.9 G/DL (ref 12–16)
LDL CHOLESTEROL CALCULATED: 93 MG/DL
LYMPHOCYTES ABSOLUTE: 2.5 K/UL (ref 1–5.1)
LYMPHOCYTES RELATIVE PERCENT: 35.2 %
MAGNESIUM: 2.2 MG/DL (ref 1.8–2.4)
MCH RBC QN AUTO: 29.2 PG (ref 26–34)
MCHC RBC AUTO-ENTMCNC: 33.5 G/DL (ref 31–36)
MCV RBC AUTO: 87.3 FL (ref 80–100)
MONOCYTES ABSOLUTE: 0.6 K/UL (ref 0–1.3)
MONOCYTES RELATIVE PERCENT: 8.7 %
NEUTROPHILS ABSOLUTE: 3.7 K/UL (ref 1.7–7.7)
NEUTROPHILS RELATIVE PERCENT: 51.7 %
PDW BLD-RTO: 13.7 % (ref 12.4–15.4)
PLATELET # BLD: 262 K/UL (ref 135–450)
PMV BLD AUTO: 8 FL (ref 5–10.5)
POTASSIUM SERPL-SCNC: 4.1 MMOL/L (ref 3.5–5.1)
RBC # BLD: 5.11 M/UL (ref 4–5.2)
SODIUM BLD-SCNC: 138 MMOL/L (ref 136–145)
TOTAL PROTEIN: 6.1 G/DL (ref 6.4–8.2)
TRIGL SERPL-MCNC: 120 MG/DL (ref 0–150)
TSH REFLEX: 1.66 UIU/ML (ref 0.27–4.2)
VITAMIN B-12: 403 PG/ML (ref 211–911)
VLDLC SERPL CALC-MCNC: 24 MG/DL
WBC # BLD: 7.2 K/UL (ref 4–11)

## 2020-06-25 LAB
ESTIMATED AVERAGE GLUCOSE: 111.2 MG/DL
HBA1C MFR BLD: 5.5 %

## 2020-08-13 ENCOUNTER — HOSPITAL ENCOUNTER (OUTPATIENT)
Dept: CT IMAGING | Age: 65
Discharge: HOME | End: 2020-08-13
Payer: MEDICARE

## 2020-08-13 ENCOUNTER — HOSPITAL ENCOUNTER (OUTPATIENT)
Dept: WOMENS IMAGING | Age: 65
Discharge: HOME | End: 2020-08-13
Payer: MEDICARE

## 2020-08-13 PROCEDURE — G0297 LDCT FOR LUNG CA SCREEN: HCPCS

## 2020-08-13 PROCEDURE — 77067 SCR MAMMO BI INCL CAD: CPT

## 2020-12-28 RX ORDER — PANTOPRAZOLE SODIUM 40 MG/1
TABLET, DELAYED RELEASE ORAL
Qty: 90 TABLET | Refills: 0 | Status: SHIPPED | OUTPATIENT
Start: 2020-12-28 | End: 2021-03-31

## 2020-12-28 RX ORDER — LOSARTAN POTASSIUM AND HYDROCHLOROTHIAZIDE 25; 100 MG/1; MG/1
TABLET ORAL
Qty: 90 TABLET | Refills: 0 | Status: SHIPPED | OUTPATIENT
Start: 2020-12-28 | End: 2021-03-31

## 2021-01-12 DIAGNOSIS — F41.1 GAD (GENERALIZED ANXIETY DISORDER): ICD-10-CM

## 2021-01-12 RX ORDER — ESCITALOPRAM OXALATE 20 MG/1
TABLET ORAL
Qty: 90 TABLET | Refills: 0 | Status: SHIPPED | OUTPATIENT
Start: 2021-01-12 | End: 2021-04-19

## 2021-02-12 ENCOUNTER — NURSE TRIAGE (OUTPATIENT)
Dept: OTHER | Facility: CLINIC | Age: 66
End: 2021-02-12

## 2021-02-12 NOTE — TELEPHONE ENCOUNTER
Patient called [facility] at [facility])  with red flag complaint. Brief description of triage: Left hip pain, radiates to groin. Denies injury. Triage indicates for patient to be seen now. Care advice provided, patient verbalizes understanding; denies any other questions or concerns; instructed to call back for any new or worsening symptoms. Writer provided warm transfer to [facility] at [employer] for appointment scheduling. Attention Provider: Thank you for allowing me to participate in the care of your patient. The patient was connected to triage in response to information provided to the [facility]. Please do not respond through this encounter as the response is not directed to a shared pool. Reason for Disposition   SEVERE pain (e.g., excruciating, unable to do any normal activities)    Answer Assessment - Initial Assessment Questions  1. LOCATION and RADIATION: \"Where is the pain located? \"   Left hip pain , radiates to groin. 2. QUALITY: \"What does the pain feel like? \"  (e.g., sharp, dull, aching, burning)  Sharp pains from hip down to knee, constant. 3. SEVERITY: \"How bad is the pain? \" \"What does it keep you from doing? \"   (Scale 1-10; or mild, moderate, severe)    -  MILD (1-3): doesn't interfere with normal activities     -  MODERATE (4-7): interferes with normal activities (e.g., work or school) or awakens from sleep, limping     -  SEVERE (8-10): excruciating pain, unable to do any normal activities, unable to walk  8/10    4. ONSET: \"When did the pain start? \" \"Does it come and go, or is it there all the time? \"     December 2020    5. WORK OR EXERCISE: \"Has there been any recent work or exercise that involved this part of the body? \"    denies. 6. CAUSE: \"What do you think is causing the hip pain? \"   Possible while helping daughter move, climbing stairs. 7. AGGRAVATING FACTORS: \"What makes the hip pain worse? \" (e.g., walking, climbing stairs, running) Walking, stairs, use. 8. OTHER SYMPTOMS: \"Do you have any other symptoms? \" (e.g., back pain, pain shooting down leg,  fever, rash)     See above.     Protocols used: HIP PAIN-ADULT-[place]

## 2021-02-18 ENCOUNTER — HOSPITAL ENCOUNTER (OUTPATIENT)
Age: 66
Discharge: HOME | End: 2021-02-18
Payer: MEDICARE

## 2021-02-18 ENCOUNTER — HOSPITAL ENCOUNTER (OUTPATIENT)
Dept: GENERAL RADIOLOGY | Age: 66
Discharge: HOME | End: 2021-02-18
Payer: MEDICARE

## 2021-02-18 ENCOUNTER — OFFICE VISIT (OUTPATIENT)
Dept: INTERNAL MEDICINE CLINIC | Age: 66
End: 2021-02-18
Payer: MEDICARE

## 2021-02-18 VITALS
HEIGHT: 63 IN | TEMPERATURE: 97 F | BODY MASS INDEX: 37.92 KG/M2 | DIASTOLIC BLOOD PRESSURE: 64 MMHG | HEART RATE: 72 BPM | SYSTOLIC BLOOD PRESSURE: 110 MMHG | WEIGHT: 214 LBS

## 2021-02-18 DIAGNOSIS — M25.552 LEFT HIP PAIN: ICD-10-CM

## 2021-02-18 DIAGNOSIS — M25.552 LEFT HIP PAIN: Primary | ICD-10-CM

## 2021-02-18 DIAGNOSIS — E66.01 MORBID OBESITY (HCC): ICD-10-CM

## 2021-02-18 PROCEDURE — 3017F COLORECTAL CA SCREEN DOC REV: CPT | Performed by: FAMILY MEDICINE

## 2021-02-18 PROCEDURE — 1036F TOBACCO NON-USER: CPT | Performed by: FAMILY MEDICINE

## 2021-02-18 PROCEDURE — G8484 FLU IMMUNIZE NO ADMIN: HCPCS | Performed by: FAMILY MEDICINE

## 2021-02-18 PROCEDURE — G8427 DOCREV CUR MEDS BY ELIG CLIN: HCPCS | Performed by: FAMILY MEDICINE

## 2021-02-18 PROCEDURE — G8399 PT W/DXA RESULTS DOCUMENT: HCPCS | Performed by: FAMILY MEDICINE

## 2021-02-18 PROCEDURE — G8417 CALC BMI ABV UP PARAM F/U: HCPCS | Performed by: FAMILY MEDICINE

## 2021-02-18 PROCEDURE — 1090F PRES/ABSN URINE INCON ASSESS: CPT | Performed by: FAMILY MEDICINE

## 2021-02-18 PROCEDURE — 1123F ACP DISCUSS/DSCN MKR DOCD: CPT | Performed by: FAMILY MEDICINE

## 2021-02-18 PROCEDURE — 99213 OFFICE O/P EST LOW 20 MIN: CPT | Performed by: FAMILY MEDICINE

## 2021-02-18 PROCEDURE — 73502 X-RAY EXAM HIP UNI 2-3 VIEWS: CPT

## 2021-02-18 PROCEDURE — 4040F PNEUMOC VAC/ADMIN/RCVD: CPT | Performed by: FAMILY MEDICINE

## 2021-02-18 RX ORDER — NAPROXEN 500 MG/1
500 TABLET ORAL 2 TIMES DAILY WITH MEALS
Qty: 60 TABLET | Refills: 0 | Status: SHIPPED | OUTPATIENT
Start: 2021-02-18 | End: 2021-04-12

## 2021-02-18 SDOH — ECONOMIC STABILITY: INCOME INSECURITY: HOW HARD IS IT FOR YOU TO PAY FOR THE VERY BASICS LIKE FOOD, HOUSING, MEDICAL CARE, AND HEATING?: NOT HARD AT ALL

## 2021-02-18 SDOH — ECONOMIC STABILITY: TRANSPORTATION INSECURITY
IN THE PAST 12 MONTHS, HAS THE LACK OF TRANSPORTATION KEPT YOU FROM MEDICAL APPOINTMENTS OR FROM GETTING MEDICATIONS?: NO

## 2021-02-18 SDOH — ECONOMIC STABILITY: TRANSPORTATION INSECURITY
IN THE PAST 12 MONTHS, HAS LACK OF TRANSPORTATION KEPT YOU FROM MEETINGS, WORK, OR FROM GETTING THINGS NEEDED FOR DAILY LIVING?: NO

## 2021-02-18 ASSESSMENT — PATIENT HEALTH QUESTIONNAIRE - PHQ9
2. FEELING DOWN, DEPRESSED OR HOPELESS: 0
SUM OF ALL RESPONSES TO PHQ QUESTIONS 1-9: 0
1. LITTLE INTEREST OR PLEASURE IN DOING THINGS: 0
SUM OF ALL RESPONSES TO PHQ QUESTIONS 1-9: 0

## 2021-02-18 NOTE — PROGRESS NOTES
Rate and Rhythm: Normal rate and regular rhythm. Heart sounds: Normal heart sounds. Pulmonary:      Effort: Pulmonary effort is normal.      Breath sounds: Normal breath sounds. Musculoskeletal:      Left hip: She exhibits tenderness and bony tenderness. She exhibits normal range of motion, normal strength, no swelling, no crepitus and no deformity. Comments: Very tender at greater trochanter and also at inguinal crease. SLR is negative. Pain with adduction but not abduction. Pain with internal > ext rotation. Skin:     General: Skin is warm and dry. Coloration: Skin is not pale. Findings: No erythema. Neurological:      Mental Status: She is alert and oriented to person, place, and time. Gait: Gait is intact. Deep Tendon Reflexes:      Reflex Scores:       Patellar reflexes are 0 on the right side and 0 on the left side. Psychiatric:         Behavior: Behavior normal.       Wt Readings from Last 3 Encounters:   02/18/21 214 lb (97.1 kg)   06/18/20 238 lb 9.6 oz (108.2 kg)   08/16/19 228 lb (103.4 kg)     Body mass index is 38.52 kg/m². Assessment:      1. Left hip pain  Suspect hip bursitis AND hip osteoarthritis. HEP after getting some relief with NSAID. On PPI so not so concerned about PUD but did warn of GI bleeding with NSAID and Lexapro. - naproxen (NAPROSYN) 500 MG tablet; Take 1 tablet by mouth 2 times daily (with meals)  Dispense: 60 tablet; Refill: 0  - XR HIP LEFT (2-3 VIEWS); Future    2. Morbid obesity ([facility] [address]  She is on Nutrasystem and working on weight loss  Down 24 pounds from 6/20 and down 14 lbs from 2019. Plan:      See orders. See after visit summary, patient instructions, and reference hand-outs. A PRINTED SUMMARY = AVS GIVEN TO THE PATIENT, (or if Virtual Visit, patient told it is available in My Chart or will be mailed if not active on My Chart.)    Discussed use, benefit, and side effects of prescribed medications.

## 2021-02-18 NOTE — PATIENT INSTRUCTIONS
Patient Education        Hip Bursitis: Care Instructions  Your Care Instructions     Bursitis is inflammation of the bursa. A bursa is a small sac of fluid that cushions a joint and helps it move easily. A bursa sits between a bone in the hip and the muscles and tendons in the thigh and buttock. Injury or overuse of the hip can cause bursitis. Activities that can lead to bursitis include twisting and rapid joint movement. Bursitis can cause hip pain. Bursitis usually gets better if you avoid the activity that caused it. If pain lasts or gets worse despite home treatment, your doctor may draw fluid from the bursa through a needle. This may relieve your pain and help your doctor know if you have an infection. If so, your doctor will prescribe antibiotics. If you have inflammation only, you may get a corticosteroid shot to reduce swelling and pain. Sometimes surgery is needed to drain or remove the bursa. Follow-up care is a key part of your treatment and safety. Be sure to make and go to all appointments, and call your doctor if you are having problems. It's also a good idea to know your test results and keep a list of the medicines you take. How can you care for yourself at home? · Put ice or a cold pack on your hip for 10 to 20 minutes at a time. Put a thin cloth between the ice and your skin. · After 3 days of using ice, you may use heat on your hip. You can use a hot water bottle, a heating pad set on low, or a warm, moist towel. · Rest your hip. Stop any activities that cause pain. Switch to activities that do not stress your hip. · Take your medicines exactly as prescribed. Call your doctor if you think you are having a problem with your medicine. · Ask your doctor if you can take an over-the-counter pain medicine, such as acetaminophen (Tylenol), ibuprofen (Advil, Motrin), or naproxen (Aleve). Be safe with medicines. Read and follow all instructions on the label. · To prevent stiffness, gently move the hip joint as much as you can without pain every day. As the pain gets better, keep doing range-of-motion exercises. Ask your doctor for exercises that will make the muscles around the hip joint stronger. Do these as directed. · You can slowly return to the activity that caused the pain, but do it with less effort until you can do it without pain or swelling. Be sure to warm up before and stretch after you do the activity. When should you call for help? Call your doctor now or seek immediate medical care if:    · You have a fever.     · You have increased swelling or redness in your hip.     · You cannot use your hip, or the pain in your hip gets worse. Watch closely for changes in your health, and be sure to contact your doctor if:    · You have pain for 2 weeks or longer despite home treatment. Where can you learn more? Go to [email or web address]://Petizens.[email or web address].[email or web address] org and sign in to your DailyLook account. Enter Z855 in the Locish box to learn more about \"Hip Bursitis: Care Instructions. \"     If you do not have an account, please click on the \"Sign Up Now\" link. Current as of: March 2, 2020               Content Version: 12.6  © 2006-2020 [employer]. Care instructions adapted under license by [facility] ([facility]). If you have questions about a medical condition or this instruction, always ask your healthcare professional. [place] [address] any warranty or liability for your use of this information. Patient Education        Hip Bursitis: Exercises  Introduction  Here are some examples of exercises for you to try. The exercises may be suggested for a condition or for rehabilitation. Start each exercise slowly. Ease off the exercises if you start to have pain. You will be told when to start these exercises and which ones will work best for you.   How to do the exercises  Hip rotator stretch 1. Lie on your back with both knees bent and your feet flat on the floor. 2. Put the ankle of your affected leg on your opposite thigh near your knee. 3. Use your hand to gently push your knee away from your body until you feel a gentle stretch around your hip. 4. Hold the stretch for 15 to 30 seconds. 5. Repeat 2 to 4 times. 6. Repeat steps 1 through 5, but this time use your hand to gently pull your knee toward your opposite shoulder. Iliotibial band stretch   1. Lean sideways against a wall. If you are not steady on your feet, hold on to a chair or counter. 2. Stand on the leg with the affected hip, with that leg close to the wall. Then cross your other leg in front of it. 3. Let your affected hip drop out to the side of your body and against wall. Then lean away from your affected hip until you feel a stretch. 4. Hold the stretch for 15 to 30 seconds. 5. Repeat 2 to 4 times. Straight-leg raises to the outside   1. Lie on your side, with your affected hip on top. 2. Tighten the front thigh muscles of your top leg to keep your knee straight. 3. Keep your hip and your leg straight in line with the rest of your body, and keep your knee pointing forward. Do not drop your hip back. 4. Lift your top leg straight up toward the ceiling, about 12 inches off the floor. Hold for about 6 seconds, then slowly lower your leg. 5. Repeat 8 to 12 times. Clamshell   1. Lie on your side, with your affected hip on top and your head propped on a pillow. Keep your feet and knees together and your knees bent. 2. Raise your top knee, but keep your feet together. Do not let your hips roll back. Your legs should open up like a clamshell. 3. Hold for 6 seconds. 4. Slowly lower your knee back down. Rest for 10 seconds. 5. Repeat 8 to 12 times. Follow-up care is a key part of your treatment and safety. Be sure to make and go to all appointments, and call your doctor if you are having problems. It's also a good idea to know your test results and keep a list of the medicines you take. Where can you learn more? Go to https://[email or web address].BIME [email or web address] org and sign in to your HomeStarst account. Enter X437 in the Regional Event [employer] Partnership box to learn more about \"Hip Bursitis: Exercises. \"     If you do not have an account, please click on the \"Sign Up Now\" link. Current as of: March 2, 2020               Content Version: 12.6  © 5097-7041 tab ticketbroker, Incorporated. Care instructions adapted under license by [facility] ([facility]). If you have questions about a medical condition or this instruction, always ask your healthcare professional. [address] any warranty or liability for your use of this information.

## 2021-03-01 ENCOUNTER — TELEPHONE (OUTPATIENT)
Dept: INTERNAL MEDICINE CLINIC | Age: 66
End: 2021-03-01

## 2021-03-01 NOTE — TELEPHONE ENCOUNTER
No relief yet with the Naproxen. Some arthritis so suspect this is bursitis. Can get in for steroid injection if not better by the end of this week.

## 2021-03-01 NOTE — TELEPHONE ENCOUNTER
Pt. Calling in regards to Xray results. Pt. Notified of results but had some more questions about it. Call back # [phone].  Thank you

## 2021-03-31 DIAGNOSIS — K21.9 GASTROESOPHAGEAL REFLUX DISEASE: ICD-10-CM

## 2021-03-31 DIAGNOSIS — I10 ESSENTIAL HYPERTENSION: ICD-10-CM

## 2021-03-31 RX ORDER — PANTOPRAZOLE SODIUM 40 MG/1
TABLET, DELAYED RELEASE ORAL
Qty: 90 TABLET | Refills: 0 | Status: SHIPPED | OUTPATIENT
Start: 2021-03-31 | End: 2021-07-07

## 2021-03-31 RX ORDER — LOSARTAN POTASSIUM AND HYDROCHLOROTHIAZIDE 25; 100 MG/1; MG/1
TABLET ORAL
Qty: 90 TABLET | Refills: 0 | Status: SHIPPED | OUTPATIENT
Start: 2021-03-31 | End: 2021-06-29

## 2021-04-12 ENCOUNTER — OFFICE VISIT (OUTPATIENT)
Dept: INTERNAL MEDICINE CLINIC | Age: 66
End: 2021-04-12
Payer: MEDICARE

## 2021-04-12 VITALS
HEART RATE: 80 BPM | SYSTOLIC BLOOD PRESSURE: 124 MMHG | WEIGHT: 214 LBS | DIASTOLIC BLOOD PRESSURE: 80 MMHG | HEIGHT: 63 IN | BODY MASS INDEX: 37.92 KG/M2

## 2021-04-12 DIAGNOSIS — M70.62 TROCHANTERIC BURSITIS OF LEFT HIP: Primary | ICD-10-CM

## 2021-04-12 PROCEDURE — 99212 OFFICE O/P EST SF 10 MIN: CPT | Performed by: FAMILY MEDICINE

## 2021-04-12 PROCEDURE — G8427 DOCREV CUR MEDS BY ELIG CLIN: HCPCS | Performed by: FAMILY MEDICINE

## 2021-04-12 PROCEDURE — G8417 CALC BMI ABV UP PARAM F/U: HCPCS | Performed by: FAMILY MEDICINE

## 2021-04-12 PROCEDURE — G8399 PT W/DXA RESULTS DOCUMENT: HCPCS | Performed by: FAMILY MEDICINE

## 2021-04-12 PROCEDURE — 1090F PRES/ABSN URINE INCON ASSESS: CPT | Performed by: FAMILY MEDICINE

## 2021-04-12 PROCEDURE — 3017F COLORECTAL CA SCREEN DOC REV: CPT | Performed by: FAMILY MEDICINE

## 2021-04-12 PROCEDURE — 1036F TOBACCO NON-USER: CPT | Performed by: FAMILY MEDICINE

## 2021-04-12 PROCEDURE — 4040F PNEUMOC VAC/ADMIN/RCVD: CPT | Performed by: FAMILY MEDICINE

## 2021-04-12 PROCEDURE — 20550 NJX 1 TENDON SHEATH/LIGAMENT: CPT | Performed by: FAMILY MEDICINE

## 2021-04-12 PROCEDURE — 1123F ACP DISCUSS/DSCN MKR DOCD: CPT | Performed by: FAMILY MEDICINE

## 2021-04-12 RX ORDER — TRIAMCINOLONE ACETONIDE 40 MG/ML
40 INJECTION, SUSPENSION INTRA-ARTICULAR; INTRAMUSCULAR ONCE
Status: COMPLETED | OUTPATIENT
Start: 2021-04-12 | End: 2021-04-12

## 2021-04-12 RX ADMIN — TRIAMCINOLONE ACETONIDE 40 MG: 40 INJECTION, SUSPENSION INTRA-ARTICULAR; INTRAMUSCULAR at 13:50

## 2021-04-12 ASSESSMENT — ENCOUNTER SYMPTOMS: COLOR CHANGE: 0

## 2021-04-12 NOTE — PROGRESS NOTES
Subjective:      Patient ID: Estrellita Hobbs is a 72 y.o. female. HPI   Chief Complaint   Patient presents with    Injections     hip injection for hip pain, left hip       Left hip pain. The pain is the same. No better. Hurts to walk. Lately it is waking her up. Took naproxen for 2 weeks and it gave her a lot of diarrhea. I saw her on 2/18/21 for this issue  Present for 4+ months. Lateral upper hip. Suspected bursitis    Review of Systems   Constitutional: Negative for fever. Skin: Negative for color change, rash and wound. Hematological: Does not bruise/bleed easily. Objective:   Physical Exam  Constitutional:       Appearance: Normal appearance. Eyes:      General: No scleral icterus. Pulmonary:      Effort: Pulmonary effort is normal. No respiratory distress. Musculoskeletal:        Legs:    Skin:     Coloration: Skin is not pale. Neurological:      General: No focal deficit present. Mental Status: She is alert and oriented to person, place, and time. Psychiatric:         Mood and Affect: Mood normal.         Behavior: Behavior normal.          XR of left hip showed mild bilateral hip OA. 2/18/21. Wt Readings from Last 3 Encounters:   04/12/21 214 lb (97.1 kg)   02/18/21 214 lb (97.1 kg)   06/18/20 238 lb 9.6 oz (108.2 kg)     Temp Readings from Last 3 Encounters:   02/18/21 97 °F (36.1 °C) (Temporal)   06/18/20 98.4 °F (36.9 °C)   04/25/16 98.5 °F (36.9 °C) (Oral)     BP Readings from Last 3 Encounters:   04/12/21 124/80   02/18/21 110/64   06/18/20 132/88     A trigger point injection was performed at the site of maximal tenderness using Kenalog 40 mg. This was well tolerated without any bleeding or complications. Assessment:      1. Trochanteric bursitis of left hip  Discussed ice tid for 3 days. See ortho or refer to PT if not improved.     - [place] INJECT TENDON SHEATH/LIGAMENT  - triamcinolone acetonide (KENALOG-40) injection 40 mg          Plan:      See orders. See after visit summary, patient instructions, and reference hand-outs. A PRINTED SUMMARY = AVS GIVEN TO THE PATIENT, (or if Virtual Visit, patient told it is available in My Chart or will be mailed if not active on My Chart.)    Discussed use, benefit, and side effects of prescribed medications. Barriers to medication compliance addressed. All patient questions answered.       Follow up:  marco a Mccullough MD

## 2021-04-12 NOTE — PATIENT INSTRUCTIONS
put stress on your hip. Steroids don't always work. But when they do, the pain relief can last for several days to a few months or longer. Follow-up care is a key part of your treatment and safety. Be sure to make and go to all appointments, and call your doctor if you are having problems. It's also a good idea to know your test results and keep a list of the medicines you take. Where can you learn more? Go to [email or web address] Learning [email or web address] org and sign in to your peerTransfer account. Enter U895 in the Departing box to learn more about \"Learning About a Hip Bursa Injection. \"     If you do not have an account, please click on the \"Sign Up Now\" link. Current as of: November 16, 2020               Content Version: 12.8  © 6860-1230 Healthwise, Incorporated. Care instructions adapted under license by [facility] ([facility]). If you have questions about a medical condition or this instruction, always ask your healthcare professional. [address] any warranty or liability for your use of this information.

## 2021-04-17 DIAGNOSIS — F41.1 GAD (GENERALIZED ANXIETY DISORDER): ICD-10-CM

## 2021-04-19 RX ORDER — ESCITALOPRAM OXALATE 20 MG/1
TABLET ORAL
Qty: 90 TABLET | Refills: 0 | Status: SHIPPED | OUTPATIENT
Start: 2021-04-19 | End: 2021-07-20

## 2021-06-29 DIAGNOSIS — I10 ESSENTIAL HYPERTENSION: ICD-10-CM

## 2021-06-29 RX ORDER — LOSARTAN POTASSIUM AND HYDROCHLOROTHIAZIDE 25; 100 MG/1; MG/1
TABLET ORAL
Qty: 90 TABLET | Refills: 0 | Status: SHIPPED | OUTPATIENT
Start: 2021-06-29 | End: 2021-08-05 | Stop reason: SDUPTHER

## 2021-07-19 DIAGNOSIS — F41.1 GAD (GENERALIZED ANXIETY DISORDER): ICD-10-CM

## 2021-07-20 RX ORDER — ESCITALOPRAM OXALATE 20 MG/1
TABLET ORAL
Qty: 90 TABLET | Refills: 0 | Status: SHIPPED | OUTPATIENT
Start: 2021-07-20 | End: 2021-08-05 | Stop reason: SDUPTHER

## 2021-08-05 ENCOUNTER — OFFICE VISIT (OUTPATIENT)
Dept: INTERNAL MEDICINE CLINIC | Age: 66
End: 2021-08-05
Payer: MEDICARE

## 2021-08-05 VITALS
DIASTOLIC BLOOD PRESSURE: 68 MMHG | WEIGHT: 213 LBS | SYSTOLIC BLOOD PRESSURE: 110 MMHG | HEART RATE: 75 BPM | HEIGHT: 63 IN | BODY MASS INDEX: 37.74 KG/M2

## 2021-08-05 DIAGNOSIS — F41.1 GAD (GENERALIZED ANXIETY DISORDER): ICD-10-CM

## 2021-08-05 DIAGNOSIS — K21.9 GASTROESOPHAGEAL REFLUX DISEASE, UNSPECIFIED WHETHER ESOPHAGITIS PRESENT: ICD-10-CM

## 2021-08-05 DIAGNOSIS — I10 ESSENTIAL HYPERTENSION: ICD-10-CM

## 2021-08-05 DIAGNOSIS — M70.62 TROCHANTERIC BURSITIS, LEFT HIP: Primary | ICD-10-CM

## 2021-08-05 PROCEDURE — 3288F FALL RISK ASSESSMENT DOCD: CPT | Performed by: FAMILY MEDICINE

## 2021-08-05 PROCEDURE — G8417 CALC BMI ABV UP PARAM F/U: HCPCS | Performed by: FAMILY MEDICINE

## 2021-08-05 PROCEDURE — G8427 DOCREV CUR MEDS BY ELIG CLIN: HCPCS | Performed by: FAMILY MEDICINE

## 2021-08-05 PROCEDURE — 99211 OFF/OP EST MAY X REQ PHY/QHP: CPT | Performed by: FAMILY MEDICINE

## 2021-08-05 PROCEDURE — 20550 NJX 1 TENDON SHEATH/LIGAMENT: CPT | Performed by: FAMILY MEDICINE

## 2021-08-05 RX ORDER — LOSARTAN POTASSIUM AND HYDROCHLOROTHIAZIDE 25; 100 MG/1; MG/1
TABLET ORAL
Qty: 90 TABLET | Refills: 0 | Status: SHIPPED | OUTPATIENT
Start: 2021-08-05 | End: 2021-12-28

## 2021-08-05 RX ORDER — TRIAMCINOLONE ACETONIDE 40 MG/ML
40 INJECTION, SUSPENSION INTRA-ARTICULAR; INTRAMUSCULAR ONCE
Status: COMPLETED | OUTPATIENT
Start: 2021-08-05 | End: 2021-08-05

## 2021-08-05 RX ORDER — ESCITALOPRAM OXALATE 20 MG/1
TABLET ORAL
Qty: 90 TABLET | Refills: 0 | Status: SHIPPED | OUTPATIENT
Start: 2021-08-05 | End: 2022-01-21

## 2021-08-05 RX ORDER — FAMOTIDINE 20 MG/1
TABLET, FILM COATED ORAL
Qty: 60 TABLET | Status: SHIPPED | OUTPATIENT
Start: 2021-08-05 | End: 2022-07-11

## 2021-08-05 RX ORDER — PANTOPRAZOLE SODIUM 40 MG/1
TABLET, DELAYED RELEASE ORAL
Qty: 90 TABLET | Refills: 0 | Status: SHIPPED | OUTPATIENT
Start: 2021-08-05 | End: 2021-12-28

## 2021-08-05 RX ADMIN — TRIAMCINOLONE ACETONIDE 40 MG: 40 INJECTION, SUSPENSION INTRA-ARTICULAR; INTRAMUSCULAR at 14:15

## 2021-08-05 NOTE — PROGRESS NOTES
Subjective:      Patient ID: Taco Briscoe is a 77 y.o. female. HPI   Chief Complaint   Patient presents with    Bursitis     Pt here for L hip injection. Filipe Elmore is requesting a left trigger point injection for her left hip bursitis. The injection in April worked quite well until recently. She does need refills on some of her chronic medication. Allergies   Allergen Reactions    Codeine      Nausea      Vicodin [Hydrocodone-Acetaminophen]      Itching         Outpatient Medications Marked as Taking for the 8/5/21 encounter (Office Visit) with Moe Chung MD   Medication Sig Dispense Refill    escitalopram (LEXAPRO) 20 MG tablet Take one tablet by mouth once nightly 90 tablet 0    famotidine (PEPCID) 20 MG tablet Take 1 tablet nightly and may take a second pill if needed. 60 tablet prn    losartan-hydroCHLOROthiazide (HYZAAR) 100-25 MG per tablet Take one tablet by mouth daily 90 tablet 0    pantoprazole (PROTONIX) 40 MG tablet Take one tablet by mouth daily 90 tablet 0    Black Elderberry 50 MG/5ML SYRP Take by mouth daily 1 tablespoon daily      Multiple Vitamins-Minerals (OCUVITE) TABS oral tablet Take 1 tablet by mouth daily      calcium-vitamin D (OSCAL-500) 500-200 MG-UNIT per tablet Take 3 tablets by mouth daily. Review of Systems    Objective:   Physical Exam  Constitutional:       Appearance: Normal appearance. Eyes:      General: No scleral icterus. Pulmonary:      Effort: Pulmonary effort is normal. No respiratory distress. Musculoskeletal:        Legs:    Skin:     Coloration: Skin is not pale. Neurological:      General: No focal deficit present. Mental Status: She is alert and oriented to person, place, and time. Psychiatric:         Mood and Affect: Mood normal.         Behavior: Behavior normal.       Procedure:   A trigger point injection was performed at the site of maximal tenderness using Kenalog 40 mg.  This was well tolerated without any bleeding or complications.         Assessment:      1. Trochanteric bursitis, left hip  X-ray only showed mild bilateral hip osteoarthritis  Repeat trigger point injection. If it recurs again she will need physical therapy and/or referral to orthopedics. - 74504 - [place] INJECT TENDON SHEATH/LIGAMENT    2. REBECA (generalized anxiety disorder)  - escitalopram (LEXAPRO) 20 MG tablet; Take one tablet by mouth once nightly  Dispense: 90 tablet; Refill: 0    3. Gastroesophageal reflux disease  - famotidine (PEPCID) 20 MG tablet; Take 1 tablet nightly and may take a second pill if needed. Dispense: 60 tablet; Refill: prn  - pantoprazole (PROTONIX) 40 MG tablet; Take one tablet by mouth daily  Dispense: 90 tablet; Refill: 0    4. Essential hypertension  - losartan-hydroCHLOROthiazide (HYZAAR) 100-25 MG per tablet; Take one tablet by mouth daily  Dispense: 90 tablet; Refill: 0          Plan:      See orders. See after visit summary, patient instructions, and reference hand-outs. A PRINTED SUMMARY = AVS GIVEN TO THE PATIENT, (or if Virtual Visit, patient told it is available in My Chart or will be mailed if not active on My Chart.)    Discussed use, benefit, and side effects of prescribed medications. Barriers to medication compliance addressed. All patient questions answered. Follow up: She is due for a Medicare wellness visit. Will encourage her to get scheduled for this.           Lydia Landers MD

## 2021-11-04 ENCOUNTER — OFFICE VISIT (OUTPATIENT)
Dept: INTERNAL MEDICINE CLINIC | Age: 66
End: 2021-11-04
Payer: MEDICARE

## 2021-11-04 VITALS
DIASTOLIC BLOOD PRESSURE: 80 MMHG | WEIGHT: 223 LBS | HEART RATE: 80 BPM | HEIGHT: 63 IN | SYSTOLIC BLOOD PRESSURE: 132 MMHG | BODY MASS INDEX: 39.51 KG/M2

## 2021-11-04 DIAGNOSIS — Z63.79 STRESSFUL LIFE EVENT AFFECTING FAMILY: ICD-10-CM

## 2021-11-04 DIAGNOSIS — M70.62 TROCHANTERIC BURSITIS, LEFT HIP: Primary | ICD-10-CM

## 2021-11-04 PROCEDURE — G8399 PT W/DXA RESULTS DOCUMENT: HCPCS | Performed by: FAMILY MEDICINE

## 2021-11-04 PROCEDURE — 1036F TOBACCO NON-USER: CPT | Performed by: FAMILY MEDICINE

## 2021-11-04 PROCEDURE — 99212 OFFICE O/P EST SF 10 MIN: CPT | Performed by: FAMILY MEDICINE

## 2021-11-04 PROCEDURE — 1090F PRES/ABSN URINE INCON ASSESS: CPT | Performed by: FAMILY MEDICINE

## 2021-11-04 PROCEDURE — G8427 DOCREV CUR MEDS BY ELIG CLIN: HCPCS | Performed by: FAMILY MEDICINE

## 2021-11-04 PROCEDURE — G8417 CALC BMI ABV UP PARAM F/U: HCPCS | Performed by: FAMILY MEDICINE

## 2021-11-04 PROCEDURE — 3017F COLORECTAL CA SCREEN DOC REV: CPT | Performed by: FAMILY MEDICINE

## 2021-11-04 PROCEDURE — 1123F ACP DISCUSS/DSCN MKR DOCD: CPT | Performed by: FAMILY MEDICINE

## 2021-11-04 PROCEDURE — 20550 NJX 1 TENDON SHEATH/LIGAMENT: CPT | Performed by: FAMILY MEDICINE

## 2021-11-04 RX ORDER — TRIAMCINOLONE ACETONIDE 40 MG/ML
40 INJECTION, SUSPENSION INTRA-ARTICULAR; INTRAMUSCULAR ONCE
Status: SHIPPED | OUTPATIENT
Start: 2021-11-04

## 2021-11-04 NOTE — PATIENT INSTRUCTIONS
Take 400 mg of magnesium nightly. If loose bowels with a magnesium supplement, take a TUMS or calcium carbonate tablet at the same time. Other option is getting Magnesium malate or Magnesium glycinate (instead of magnesium oxide or magnesium citrate or magnesium sulfate.)      Patient Education        Learning About Mindfulness for Stress  What are mindfulness and stress? Stress is what you feel when you have to handle more than you are used to. A lot of things can cause stress. You may feel stress when you go on a job interview, take a test, or run a race. This kind of short-term stress is normal and even useful. It can help you if you need to work hard or react quickly. Stress also can last a long time. Long-term stress is caused by stressful situations or events. Examples of long-term stress include long-term health problems, ongoing problems at work, and conflicts in your family. Long-term stress can harm your health. Mindfulness is a focus only on things happening in the present moment. It's a process of purposefully paying attention to and being aware of your surroundings, your emotions, your thoughts, and how your body feels. You are aware of these things, but you aren't judging these experiences as \"good\" or \"bad. \" Mindfulness can help you learn to calm your mind and body to help you cope with illness, pain, and stress. How does mindfulness help to relieve stress? Mindfulness can help quiet your mind and relax your body. Studies show that it can help some people sleep better, feel less anxious, and bring their blood pressure down. And it's been shown to help some people live and cope better with certain health problems like heart disease, depression, chronic pain, and cancer. How do you practice mindfulness? To be mindful is to pay attention, to be present, and to be accepting. · When you're mindful, you do just one thing and you pay close attention to that one thing.  For example, you may sit quietly and notice your emotions or how your food tastes and smells. · When you're present, you focus on the things that are happening right now. You let go of your thoughts about the past and the future. When you dwell on the past or the future, you miss moments that can heal and strengthen you. You may miss moments like hearing a child laugh or seeing a friendly face when you think you're all alone. · When you're accepting, you don't  the present moment. Instead you accept your thoughts and feelings as they come. You can practice anytime, anywhere, and in any way you choose. You can practice in many ways. Here are a few ideas:  · While doing your chores, like washing the dishes, let your mind focus on what's in your hand. What does the dish feel like? Is the water warm or cold? · Go outside and take a few deep breaths. What is the air like? Is it warm or cold? · When you can, take some time at the start of your day to sit alone and think. · Take a slow walk by yourself. Count your steps while you breathe in and out. · Try yoga breathing exercises, stretches, and poses to strengthen and relax your muscles. · At work, if you can, try to stop for a few moments each hour. Note how your body feels. Let yourself regroup and let your mind settle before you return to what you were doing. · If you struggle with anxiety or \"worry thoughts,\" imagine your mind as a blue nicole and your worry thoughts as clouds. Now imagine those worry thoughts floating across your mind's nicole. Just let them pass by as you watch. Follow-up care is a key part of your treatment and safety. Be sure to make and go to all appointments, and call your doctor if you are having problems. It's also a good idea to know your test results and keep a list of the medicines you take. Where can you learn more? Go to https://rolando.APIM [email or web address] org and sign in to your Confabb account.  Enter Y790 in the retickr box to learn more about \"Learning About Mindfulness for Stress. \"     If you do not have an account, please click on the \"Sign Up Now\" link. Current as of: June 16, 2021               Content Version: 13.0  © 2006-2021 Usable Security Systems. Care instructions adapted under license by [facility] ([facility]). If you have questions about a medical condition or this instruction, always ask your healthcare professional. [address] any warranty or liability for your use of this information. Patient Education         Stress and Coping: Mindfulness Can Help (02:29)  Your health professional recommends that you watch this short online health video. Learn how two people practice mindfulness to help them cope with stress or a health problem. Purpose:  Demonstrates strategies for practicing mindfulness to reduce stress or cope with a health problem. Goal:  The user will learn how to use mindfulness to reduce stress or live with a chronic condition. How to watch the video    Scan the QR code   OR Visit the website    [email or web address]/[email or web address]   Current as of: June 16, 2021               Content Version: 13.0  © 2006-2021 [employer] Security Systems. Care instructions adapted under license by [facility] ([facility]). If you have questions about a medical condition or this instruction, always ask your healthcare professional. [address] any warranty or liability for your use of this information. Patient Education         Stress Management: Progressive Muscle Relaxation (04:38)  Your health professional recommends that you watch this short online health video. Learn how to reduce stress by tensing and relaxing your muscles. Purpose:  Guides viewer through progressive muscle relaxation as a means of reducing stress. Goal:  The user will learn how to reduce stress through tensing and relaxing each muscle group.      How to watch the video    Scan the QR code   OR Visit the website    https://[email or web address] se/r/Gifcgxceqzybs   Current as of: June 16, 2021               Content Version: 13.0  © 2006-2021 Healthwise, Incorporated. Care instructions adapted under license by [facility] ([facility]). If you have questions about a medical condition or this instruction, always ask your healthcare professional. [place] [address] any warranty or liability for your use of this information.

## 2021-11-04 NOTE — PROGRESS NOTES
2021    Greg Esquivel (:  1955) is a 77 y.o. female, here for evaluation of the following chief complaint(s):  Injections      ASSESSMENT/PLAN:    1. Trochanteric bursitis, left hip  Discussed that will need to see ortho if this does not resolve. -  - [place] INJECT TENDON SHEATH/LIGAMENT  - triamcinolone acetonide (KENALOG-40) injection 40 mg    2. Stressful life event affecting family  General support given with hand outs on mindfulness, etc.  Will refer to therapy if desired. FOLLOW UP:  Call or return to clinic prn if these symptoms worsen or fail to improve as anticipated. SUBJECTIVE/OBJECTIVE:    HPI    Wants a left thigh injections for trochanteric bursitis. Last injection did help but temporary. Under lots of Stress  Her daughter and 4 kids are living with her and her . .    Daughter LPN but got caught up with pain medication. Lost her house with [employer]. She has a job now. The living with them was for 3 months. Now 2.5 months. She has not looked into any other living arrangements. Stress eating. Outpatient Medications Marked as Taking for the 21 encounter (Office Visit) with Cindy Bergeron MD   Medication Sig Dispense Refill    escitalopram (LEXAPRO) 20 MG tablet Take one tablet by mouth once nightly 90 tablet 0    famotidine (PEPCID) 20 MG tablet Take 1 tablet nightly and may take a second pill if needed. 60 tablet prn    losartan-hydroCHLOROthiazide (HYZAAR) 100-25 MG per tablet Take one tablet by mouth daily 90 tablet 0    pantoprazole (PROTONIX) 40 MG tablet Take one tablet by mouth daily 90 tablet 0    Black Elderberry 50 MG/5ML SYRP Take by mouth daily 1 tablespoon daily      Multiple Vitamins-Minerals (OCUVITE) TABS oral tablet Take 1 tablet by mouth daily      calcium-vitamin D (OSCAL-500) 500-200 MG-UNIT per tablet Take 3 tablets by mouth daily.            Review of Systems        Vitals:    21 1137   BP: 132/80 Pulse: 80   Weight: 223 lb (101.2 kg)   Height: 5' 2.52\" (1.588 m)       Wt Readings from Last 3 Encounters:   11/04/21 223 lb (101.2 kg)   08/05/21 213 lb (96.6 kg)   04/12/21 214 lb (97.1 kg)       BP Readings from Last 3 Encounters:   11/04/21 132/80   08/05/21 110/68   04/12/21 124/80       Physical Exam  Constitutional:       Appearance: Normal appearance. Eyes:      General: No scleral icterus. Pulmonary:      Effort: Pulmonary effort is normal. No respiratory distress. Musculoskeletal:        Legs:    Skin:     Coloration: Skin is not pale. Findings: No erythema, lesion or rash. Comments: Left upper leg, hip area: skin is clear. Neurological:      General: No focal deficit present. Mental Status: She is alert and oriented to person, place, and time. Psychiatric:         Mood and Affect: Mood normal.         Behavior: Behavior normal.         XR hips 2/18/21:  Mild OA. Procedure:   A trigger point injection was performed at the site of maximal tenderness using Kenalog 40 mg. This was well tolerated without any bleeding or complications. An electronic signature was used to authenticate this note.     Marek Valente MD

## 2021-11-29 ENCOUNTER — TELEPHONE (OUTPATIENT)
Dept: INTERNAL MEDICINE CLINIC | Age: 66
End: 2021-11-29

## 2021-11-29 DIAGNOSIS — M70.62 TROCHANTERIC BURSITIS, LEFT HIP: Primary | ICD-10-CM

## 2021-11-29 NOTE — TELEPHONE ENCOUNTER
----- Message from Rachna Plant sent at 11/29/2021 11:12 AM EST -----  Subject: Message to Provider    QUESTIONS  Information for Provider? The patient is calling in regards to ongoing hip   condition. The patient states that this has gotten pretty severe and she   would like a referral to Orthopedic specialist. The patient would like a   call back. to advise  ---------------------------------------------------------------------------  --------------  CALL BACK INFO  What is the best way for the office to contact you? OK to leave message on   voicemail  Preferred Call Back Phone Number? [phone]  ---------------------------------------------------------------------------  --------------  SCRIPT ANSWERS  Relationship to Patient?  Self

## 2021-11-30 NOTE — TELEPHONE ENCOUNTER
I did a referral to [facility]. Dr. Alyssa Mendenhall, Anne-Marie Dobbins or associate. Call and give her the phone number for that office.

## 2021-12-07 ENCOUNTER — OFFICE VISIT (OUTPATIENT)
Dept: ORTHOPEDIC SURGERY | Age: 66
End: 2021-12-07
Payer: MEDICARE

## 2021-12-07 VITALS — BODY MASS INDEX: 38.98 KG/M2 | WEIGHT: 220 LBS | RESPIRATION RATE: 16 BRPM | HEIGHT: 63 IN

## 2021-12-07 DIAGNOSIS — M16.12 PRIMARY OSTEOARTHRITIS OF LEFT HIP: Primary | ICD-10-CM

## 2021-12-07 PROCEDURE — G8484 FLU IMMUNIZE NO ADMIN: HCPCS | Performed by: ORTHOPAEDIC SURGERY

## 2021-12-07 PROCEDURE — 4040F PNEUMOC VAC/ADMIN/RCVD: CPT | Performed by: ORTHOPAEDIC SURGERY

## 2021-12-07 PROCEDURE — 1036F TOBACCO NON-USER: CPT | Performed by: ORTHOPAEDIC SURGERY

## 2021-12-07 PROCEDURE — 3017F COLORECTAL CA SCREEN DOC REV: CPT | Performed by: ORTHOPAEDIC SURGERY

## 2021-12-07 PROCEDURE — G8427 DOCREV CUR MEDS BY ELIG CLIN: HCPCS | Performed by: ORTHOPAEDIC SURGERY

## 2021-12-07 PROCEDURE — 1123F ACP DISCUSS/DSCN MKR DOCD: CPT | Performed by: ORTHOPAEDIC SURGERY

## 2021-12-07 PROCEDURE — G8417 CALC BMI ABV UP PARAM F/U: HCPCS | Performed by: ORTHOPAEDIC SURGERY

## 2021-12-07 PROCEDURE — 99203 OFFICE O/P NEW LOW 30 MIN: CPT | Performed by: ORTHOPAEDIC SURGERY

## 2021-12-07 PROCEDURE — G8399 PT W/DXA RESULTS DOCUMENT: HCPCS | Performed by: ORTHOPAEDIC SURGERY

## 2021-12-07 PROCEDURE — 1090F PRES/ABSN URINE INCON ASSESS: CPT | Performed by: ORTHOPAEDIC SURGERY

## 2021-12-07 NOTE — PROGRESS NOTES
Onset    Lung Cancer Mother     Heart Disease Mother     Lung Cancer Father     Diabetes Maternal Grandfather     Endometrial Cancer Sister         62s;  caught early       Social History     Socioeconomic History    Marital status:      Spouse name: Tylor Yo Number of children: 2    Years of education: Not on file    Highest education level: Not on file   Occupational History    Occupation:  at [employer] retired. 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 (born 2018)    Niece is Shannan Barba ([facility])     Social Determinants of Health     Financial Resource Strain: Low Risk     Difficulty of Paying Living Expenses: Not hard at all   Food Insecurity: No Food Insecurity    Worried About Running Out of Food in the Last Year: Never true    Sallie of Food in the Last Year: Never true   Transportation Needs: No Transportation Needs    Lack of Transportation (Medical): No    Lack of Transportation (Non-Medical):  No   Physical Activity:     Days of Exercise per Week: Not on file    Minutes of Exercise per Session: Not on file   Stress:     Feeling of Stress : Not on file   Social Connections:     Frequency of Communication with Friends and Family: Not on file    Frequency of Social Gatherings with Friends and Family: Not on file    Attends Evangelical Services: Not on file    Active Member of Clubs or Organizations: Not on file    Attends Club or Organization Meetings: Not on file    Marital Status: Not on file   Intimate Partner Violence:     Fear of Current or Ex-Partner: Not on file    Emotionally Abused: Not on file    Physically Abused: Not on file    Sexually Abused: Not on file   Housing Stability:     Unable to Pay for Housing in the Last Year: Not on file    Number of [place] in the Last Year: Not on file    Unstable Housing in the Last Year: Not on file       Current Outpatient Medications   Medication Sig Dispense Refill    escitalopram (LEXAPRO) 20 MG tablet Take one tablet by mouth once nightly 90 tablet 0    famotidine (PEPCID) 20 MG tablet Take 1 tablet nightly and may take a second pill if needed. 60 tablet prn    losartan-hydroCHLOROthiazide (HYZAAR) 100-25 MG per tablet Take one tablet by mouth daily 90 tablet 0    pantoprazole (PROTONIX) 40 MG tablet Take one tablet by mouth daily 90 tablet 0    Black Elderberry 50 MG/5ML SYRP Take by mouth daily 1 tablespoon daily      Multiple Vitamins-Minerals (OCUVITE) TABS oral tablet Take 1 tablet by mouth daily      calcium-vitamin D (OSCAL-500) 500-200 MG-UNIT per tablet Take 3 tablets by mouth daily. Current Facility-Administered Medications   Medication Dose Route Frequency Provider Last Rate Last Admin    triamcinolone acetonide (KENALOG-40) injection 40 mg  40 mg IntraMUSCular Once Aayush Sun MD           Allergies   Allergen Reactions    Codeine      Nausea      Vicodin [Hydrocodone-Acetaminophen]      Itching          Review of Systems:  I have reviewed the clinically relevant past medical history, medications, allergies, family history, social history, and 13 point Review of Systems from the patient's recent history form & documented any details relevant to today's presenting complaints in the history above. The patient's self-reported past medical history, medications, allergies, family history, social history, and Review of Systems form from 12/7/21 have been scanned into the chart under the \"Media\" tab.       Physical Examination:     Vital signs:   Resp 16 Ht 5' 2.52\" (1.588 m)   Wt 220 lb (99.8 kg)   BMI 39.57 kg/m²     General:  alert, appears stated age, cooperative and no distress   Feet:  normal   Gait:  Normal. The patient can bear weight on the injured extremity. Left Hip  Trendelenburg test:  positive on Left   negative on Right      Passive ROM:  0 degrees extension    100 degrees flexion   45 degrees external rotation   10 degrees internal rotation   Bilateral hips   ZACKARY:  2 1/2   Impingement test:  positive   Right hip: mildly positive   Labral stress test:  positive   Right hip: negative   James test:  negative   Greater trochanter tenderness:  negative   Pirifiormis / Gluteus medius tenderness:  negative   Iliopsoas strength:  5 / 5    Bilateral hips   Logroll:  negative   Straight-leg raise:  negative   Leg length discrepancy:  Equal      Motor exam noted and recorded, quadriceps, hamstrings, foot dorsiflexors and plantar flexors are intact 5/5 equal and symmetric. Sensation is intact grossly to light touch in the tibial, peroneal, sural, and saphenous nerve distributions bilaterally. Both feet are well perfused and sensory is intact to feet equal and symmetric. There is not  any cellulitis, skin lesions, or lymphedema noted bilaterally. Imaging:  Left hip xrays 2/18/21: reviewed. They demonstrate no evidence of acute fracture, subluxation, or dislocation. She has bilateral moderate to severe central joint space narrowing      Assessment:     Left hip osteoarthritis  Morbid obesity  GERD  Hiatal hernia  Hypertension        Plan:     Natural history and expected course discussed. Questions answered. Discussed the natural history of osteoarthritis. We discussed nonoperative treatment options as well as eventual surgical treatment with total hip arthroplasty. Continue ice as needed. Continue NSAIDs as needed. PT referral provided for gluteal strengthening.   She does do a few sessions to demonstrate a home exercise program.    Referral to Dr. Allie Maddox for ultrasound-guided hip joint injection. Follow-up as needed. Zulma Irene MD  Orthopaedic Surgery and Sports Medicine     Disclaimer: This note was generated with use of a verbal recognition program and an attempt was made to check for errors. It is possible that there are still dictated errors within this office note. If so, please bring any significant errors to my attention for an addendum. All efforts were made to ensure that this office note is accurate.

## 2021-12-08 ENCOUNTER — HOSPITAL ENCOUNTER (OUTPATIENT)
Dept: PHYSICAL THERAPY | Age: 66
Setting detail: THERAPIES SERIES
Discharge: HOME | End: 2021-12-08
Payer: MEDICARE

## 2021-12-08 PROCEDURE — 97110 THERAPEUTIC EXERCISES: CPT

## 2021-12-08 PROCEDURE — 97161 PT EVAL LOW COMPLEX 20 MIN: CPT

## 2021-12-08 NOTE — PLAN OF CARE
[address] [address], [address] [place] [address] Drive  Phone: [phone]   Fax: [phone]                                                       Physical Therapy Certification    Dear Referring Practitioner: Kevon Rodrigez MD,    We had the pleasure of evaluating the following patient for physical therapy services at [address] [place] [address]. A summary of our findings can be found in the initial assessment below. This includes our plan of care. If you have any questions or concerns regarding these findings, please do not hesitate to contact me at the office phone number checked above. Thank you for the referral.       Physician Signature:_______________________________Date:__________________  By signing above (or electronic signature), therapists plan is approved by physician      Patient: Krystin Doe   : 1955   MRN: [id number]  Referring Physician: Referring Practitioner: Kevon Rodrigez MD      Evaluation Date: 2021      Medical Diagnosis Information:  Diagnosis: M16.12 (ICD-10-CM) - Primary osteoarthritis of left hip   Treatment Diagnosis: decreased ROM/strength of L hip , decreased gait abilities , impaired functional status. Insurance information: PT Insurance Information: [facility] medicare     Precautions/ Contra-indications:   Latex Allergy:  [x]NO      []YES  Preferred Language for Healthcare:   [x]English       []Other:    C-SSRS Triggered by Intake questionnaire (Past 2 wk assessment ):   [x] No, Questionnaire did not trigger screening.   [] Yes, Patient intake triggered C-SSRS Screening     [] Completed, no further action required. [] Completed, PCP notified via Epic    SUBJECTIVE: Patient stated complaint: Pt was referred by Dr. Cher Villarreal due to OA of L hip. Pt states pain is moderate and can radiate into L thigh sometimes.  States can have stiffness all day. Pt  enjoys walking and wants to get back to being active. Pt reports she can only stand and walk for 3-5 minutes before she needs to take a break which has been progressively getting worse. Has been going on for awhile. Scheduled to get injection by Anusha Rodriguez next week. Pt states she lives in a tri level and the stairs are more painful and harder. Relevant Medical History: HTN, Hx of CA  Functional Outcome: raw score = ; dysfunction = % - did  Not complete at eval     Pain Scale: 7/10  Easing factors: rest, medication   Provocative factors: walking, standing, stairs     Type: []Constant   [x]Intermittent  []Radiating []Localized []Other:     Numbness/Tingling: none     Occupation/School: retired     Living Status/Prior Level of Function:Prior to this injury / incident, pt was independent with ADLs and IADLs, pt has 8 grandkids and enjoys playing with them. Ambulates with no AD, active , enjoys walking. OBJECTIVE:   Palpation: TTP overal anterior , anterior/lateral L hip    Functional Mobility/Transfers: IND no issues noted     Posture: decreased stance on L    Bandages/Dressings/Incisions: n/a    Gait: (include devices/WB status) decreased arm swing, decreased speed.      Dermatomes Normal Abnormal Comments   inguinal area (L1)       anterior mid-thigh (L2)      distal ant thigh/med knee (L3) x     medial lower leg and foot (L4) x     lateral lower leg and foot (L5) x     posterior calf (S1) x     medial calcaneus (S2) x         Reflexes Normal Abnormal Comments   S1-2 Seated achilles      S1-2 Prone knee bend      L3-4 Patellar tendon      Clonus      Babinski           PROM AROM    L R L R   Hip Flexion 90 WNL     Hip Abduction 10 WNL     Hip ER 15 WNL     Hip IR 5 WNL     Knee Flexion WNL WNL     Knee Extension WNL WNL     Dorsiflexion        Plantarflexion        Inversion        Eversion            Strength (0-5) / Myotomes Left Right   Hip Flexion - supine     Hip Flexion - seated (L1-2) 4- 4+   Hip Abduction 4- 4+   Hip Adduction     Hip ER     Hip IR     Quads (L2-4) 4+ 5   Hamstrings 4+ 5   Ankle Dorsiflexion (L4-5) 5 5   Ankle Plantarflexion (S1-2)     Ankle Inversion     Ankle Eversion (S1-2)     Great Toe Extension (L5)          Flexibility     Hamstrings (90/90) + +   ITB Heather Fellers)     Quads (Ely's)     Hip Flexor Nellie Rinne)          Girth     Mid patella     Suprapatellar     Figure 8     Transmalleolar     Metatarsal Heads         Joint mobility: L HIP   []Normal    [x]Hypo   []Hyper    Orthopaedic Special Tests  Positive  Negative  NT Comments    Hip       ZACKARY / Derek's x      FADIR x      Scour       Trendelenburg x             Knee       Lachman's / Anterior Drawer       Posterior Drawer       Varus Stress       Valgus Stress       Parag's        Appley's       Thessaly's       Patellar Tracking              Ankle       Anterior Drawer       Talar Tilt       Mejia       Jomar's                   Balance: GOOD-                        [x] Patient history, allergies, meds reviewed. Medical chart reviewed. See intake form. Review Of Systems (ROS):  [x]Performed Review of systems (Integumentary, CardioPulmonary, Neurological) by intake and observation. Intake form has been scanned into medical record. Patient has been instructed to contact their primary care physician regarding ROS issues if not already being addressed at this time.       Co-morbidities/Complexities (which will affect course of rehabilitation):   []None        []Hx of COVID   Arthritic conditions   []Rheumatoid arthritis (M05.9)  []Osteoarthritis (M19.91)  []Gout   Cardiovascular conditions   [x]Hypertension (I10)  []Hyperlipidemia (E78.5)  []Angina pectoris (I20)  []Atherosclerosis (I70)  []Pacemaker  []Hx of CABG/stent/  cardiac surgeries   Musculoskeletal conditions   []Disc pathology   []Congenital spine pathologies   []Osteoporosis (M81.8)  []Osteopenia (M85.8)  []Scoliosis       Endocrine conditions   []Hypothyroid (E03.9)  []Hyperthyroid Gastrointestinal conditions   []Constipation (C53.70)   Metabolic conditions   []Morbid obesity (E66.01)  []Diabetes type 1(E10.65) or 2 (E11.65)   []Neuropathy (G60.9)     Cardio/Pulmonary conditions   []Asthma (J45)  []Coughing   []COPD (J44.9)  []CHF  []A-fib   Psychological Disorders  []Anxiety (F41.9)  []Depression (F32.9)   []Other:   Developmental Disorders  []Autism (F84.0)  []CP (G80)  []Down Syndrome (Q90.9)  []Developmental delay     Neurological conditions  []Prior Stroke (I69.30)  []Parkinson's (G20)  []Encephalopathy (G93.40)  []MS (G35)  []Post-polio (G14)  []SCI  []TBI  []ALS Other conditions  []Fibromyalgia (M79.7)  []Vertigo  []Syncope  []Kidney Failure  [x]Cancer      []currently undergoing                treatment  []Pregnancy  []Incontinence   Prior surgeries  []involved limb  []previous spinal surgery  [] section birth  []hysterectomy  []bowel / bladder surgery  []other relevant surgeries   []Other:              Barriers to/and or personal factors that will affect rehab potential:              []Age  []Sex    []Smoker              []Motivation/Lack of Motivation                        []Co-Morbidities              []Cognitive Function, education/learning barriers              []Environmental, home barriers              []profession/work barriers  []past PT/medical experience  []other:  Justification:     Falls Risk Assessment (30 days):   [x] Falls Risk assessed and no intervention required.   [] Falls Risk assessed and Patient requires intervention due to being higher risk   TUG score (>12s at risk):     [] Falls education provided, including        ASSESSMENT:   Functional Impairments:     [x]Noted proximal hip/LE joint hypomobility   [x]Decreased LE functional ROM   [x]Decreased core/proximal hip strength and neuromuscular control   [x]Decreased LE functional strength   [x]Reduced balance/proprioceptive control   []other: Functional Activity Limitations (from functional questionnaire and intake)   [x]Reduced ability to tolerate prolonged functional positions   [x]Reduced ability or difficulty with changes of positions or transfers between positions   [x]Reduced ability to maintain good posture and demonstrate good body mechanics with sitting, bending, and lifting   [x]Reduced ability to sleep   [x] Reduced ability or tolerance with driving and/or computer work   [x]Reduced ability to perform lifting, carrying tasks   [x]Reduced ability to squat   [x]Reduced ability to forward bend   [x]Reduced ability to ambulate prolonged functional periods/distances/surfaces   [x]Reduced ability to ascend/descend stairs   [x]Reduced ability to run, hop, cut or jump   []other:    Participation Restrictions   [x]Reduced participation in self care activities   [x]Reduced participation in home management activities   [x]Reduced participation in work activities   [x]Reduced participation in social activities. [x]Reduced participation in sport/recreation activities. Classification :    []Signs/symptoms consistent with post-surgical status including decreased ROM, strength and function.    []Signs/symptoms consistent with joint sprain/strain   []Signs/symptoms consistent with patella-femoral syndrome   [x]Signs/symptoms consistent with knee OA/hip OA   []Signs/symptoms consistent with internal derangement of knee/Hip   []Signs/symptoms consistent with functional hip weakness/NMR control      []Signs/symptoms consistent with tendinitis/tendinosis    []signs/symptoms consistent with pathology which may benefit from Dry needling      []other:      Prognosis/Rehab Potential:      []Excellent   [x]Good    []Fair   []Poor    Tolerance of evaluation/treatment:    []Excellent   [x]Good    []Fair   []Poor    Physical Therapy Evaluation Complexity Justification  [x] A history of present problem with:  [] no personal factors and/or comorbidities that impact the plan of care;  [x]1-2 personal factors and/or comorbidities that impact the plan of care  []3 personal factors and/or comorbidities that impact the plan of care  [x] An examination of body systems using standardized tests and measures addressing any of the following: body structures and functions (impairments), activity limitations, and/or participation restrictions;:  [] a total of 1-2 or more elements   [x] a total of 3 or more elements   [] a total of 4 or more elements   [x] A clinical presentation with:  [x] stable and/or uncomplicated characteristics   [] evolving clinical presentation with changing characteristics  [] unstable and unpredictable characteristics;   [x] Clinical decision making of [x] low, [] moderate, [] high complexity using standardized patient assessment instrument and/or measurable assessment of functional outcome. [x] EVAL (LOW) 67109 (typically 15 minutes face-to-face)  [] EVAL (MOD) 74265 (typically 30 minutes face-to-face)  [] EVAL (HIGH) 66746 (typically 45 minutes face-to-face)  [] RE-EVAL     PLAN:   Frequency/Duration:  1-2 days per week for 6 Weeks:  Interventions:  [x]  Therapeutic exercise including: strength training, ROM, for Lower extremity and core   [x]  NMR activation and proprioception for LE, Glutes and Core   [x]  Manual therapy as indicated for LE, Hip and spine to include: Dry Needling/IASTM, STM, PROM, Gr I-IV mobilizations, manipulation. [x] Modalities as needed that may include: thermal agents, E-stim, Biofeedback, US, iontophoresis as indicated  [x] Patient education on joint protection, postural re-education, activity modification, progression of HEP. HEP instruction: Written HEP instructions provided and reviewed     GOALS:  Patient stated goal: decrease pain   [] Progressing: [] Met: [] Not Met: [] Adjusted    Therapist goals for Patient:   Short Term Goals: To be achieved in: 2 weeks  1.  Independent in HEP and progression per patient tolerance, in order to prevent re-injury. [] Progressing: [] Met: [] Not Met: [] Adjusted  2. Patient will have a decrease in pain to facilitate improvement in movement, function, and ADLs as indicated by Functional Deficits. [] Progressing: [] Met: [] Not Met: [] Adjusted    Long Term Goals: To be achieved in: 6 weeks  1. Disability index score of 30% or less for the LEFS to assist with reaching prior level of function. [] Progressing: [] Met: [] Not Met: [] Adjusted  2. Patient will demonstrate increased AROM to WNL to allow for proper joint functioning as indicated by patients Functional Deficits. [] Progressing: [] Met: [] Not Met: [] Adjusted  3. Patient will demonstrate an increase in Strength to at least 4+/5 as well as good proximal hip strength and control to allow for proper functional mobility as indicated by patients Functional Deficits. [] Progressing: [] Met: [] Not Met: [] Adjusted  4. Patient will return to functional activities including ADLs, walking for 15+ minutes, stairs without increased symptoms or restriction.    [] Progressing: [] Met: [] Not Met: [] Adjusted       Electronically signed by:  Iva Mota, PT

## 2021-12-08 NOTE — FLOWSHEET NOTE
[address] [facility]  Phone: [phone]   Fax: [phone]    Physical Therapy Treatment Note/ Progress Report:     Date:  2021    Patient Name:  Trisha Fraser    :  1955  MRN: [id number]  Restrictions/Precautions:    Medical/Treatment Diagnosis Information:  Diagnosis: M16.12 (ICD-10-CM) - Primary osteoarthritis of left hip  Treatment Diagnosis: decreased ROM/strength of L hip , decreased gait abilities , impaired functional status. Insurance/Certification information:  PT Insurance Information: [facility]  Physician Information:  Referring Practitioner: Lam Bedoya MD  Plan of care signed (Y/N): []  Yes [x]  No     Date of Patient follow up with Physician:      Progress Report: []  Yes  [x]  No     Date Range for reporting period:  Beginnin21  Ending:     Progress report due (10 Rx/or 30 days whichever is less): visit #10 or 60    Recertification due (POC duration/ or 90 days whichever is less): visit # 15 or  22    Visit # Insurance Allowable Auth required?  Date Range   1 mn []  Yes  [x]  No        Latex Allergy:  [x]NO      []YES  Preferred Language for Healthcare:   [x]English       []other:    Functional Scale:        Date assessed:  LEFS: raw score = ; dysfunction = %   DNC at eval     Pain level:  5-8/10     SUBJECTIVE:  See eval    OBJECTIVE: See eval      RESTRICTIONS/PRECAUTIONS:     Exercises/Interventions:     Therapeutic Exercise (78423)  Resistance / level Sets/sec Reps Notes / Cues   bike       IB       HSS       SLR       [facility]                                          Therapeutic Activities (81603)       Step ups                            Neuromuscular Re-ed (27614)       SLS                     Manual Intervention (70286)       Knee mobs/PROM       Tib/Fem Mobs       Patella Mobs       Ankle mobs                         Modalities:     Pt. Education:  -pt educated on diagnosis, prognosis and expectations for rehab  -all pt questions were answered    Home Exercise Program:  Access Code: [email or web address]  URL: [email or web address]  Date: 12/08/2021  Prepared by: Porfirio Garland    Exercises - performed at eval x10  Supine Bridge - 1 x daily - 7 x weekly - 3 sets - 10 reps  Supine Hip Adduction Isometric with Ball - 1 x daily - 7 x weekly - 3 sets - 10 reps  Supine Active Straight Leg Raise - 1 x daily - 7 x weekly - 3 sets - 10 reps  Seated Hamstring Stretch - 1 x daily - 7 x weekly - 3 sets - 10 reps      Therapeutic Exercise and NMR EXR  [x] (12854) Provided verbal/tactile cueing for activities related to strengthening, flexibility, endurance, ROM for improvements in LE, proximal hip, and core control with self care, mobility, lifting, ambulation.  [] (12598) Provided verbal/tactile cueing for activities related to improving balance, coordination, kinesthetic sense, posture, motor skill, proprioception  to assist with LE, proximal hip, and core control in self care, mobility, lifting, ambulation and eccentric single leg control.   [] (28537) Therapist is in constant attendance of 2 or more patients providing skilled therapy interventions, but not providing any significant amount of measurable one-on-one time to either patient, for improvements in LE, proximal hip, and core control in self care, mobility, lifting, ambulation and eccentric single leg control.      NMR and Therapeutic Activities:    [] (63242 or 42050) Provided verbal/tactile cueing for activities related to improving balance, coordination, kinesthetic sense, posture, motor skill, proprioception and motor activation to allow for proper function of core, proximal hip and LE with self care and ADLs  [] (31009) Gait Re-education- Provided training and instruction to the patient for proper LE, core and proximal hip recruitment and positioning and eccentric body weight control with ambulation re-education including up and down stairs     Home Exercise Program:    [x] (69182) Reviewed/Progressed HEP activities related to strengthening, flexibility, endurance, ROM of core, proximal hip and LE for functional self-care, mobility, lifting and ambulation/stair navigation   [] (97939)Reviewed/Progressed HEP activities related to improving balance, coordination, kinesthetic sense, posture, motor skill, proprioception of core, proximal hip and LE for self care, mobility, lifting, and ambulation/stair navigation      Manual Treatments:  PROM / STM / Oscillations-Mobs:  G-I, II, III, IV (PA's, Inf., Post.)  [] (66768) Provided manual therapy to mobilize LE, proximal hip and/or LS spine soft tissue/joints for the purpose of modulating pain, promoting relaxation,  increasing ROM, reducing/eliminating soft tissue swelling/inflammation/restriction, improving soft tissue extensibility and allowing for proper ROM for normal function with self care, mobility, lifting and ambulation. Modalities:  [] (88080) Vasopneumatic compression: Utilized vasopneumatic compression to decrease edema / swelling for the purpose of improving mobility and quad tone / recruitment which will allow for increased overall function including but not limited to self-care, transfers, ambulation, and ascending / descending stairs. Charges:  Timed Code Treatment Minutes: 15   Total Treatment Minutes: 40     [x] EVAL - LOW (93439)   [] EVAL - MOD (94500)  [] EVAL - HIGH (33087)  [] RE-EVAL (89381)  [x] SW(33475) x  1    [] Ionto  [] NMR (04463) x      [] Vaso  [] Manual (45452) x      [] Ultrasound  [] TA x       [] Mech Traction (76089)  [] Aquatic Therapy x     [] ES (un) (22773):   [] Home Management Training x [] ES(attended) (38052)   [] Group:     [] Other:     GOALS:  Patient stated goal: decrease pain   []? Progressing: []? Met: []? Not Met: []? Adjusted     Therapist goals for Patient:   Short Term Goals: To be achieved in: 2 weeks  1.  Independent in HEP and progression per patient tolerance, in order to prevent re-injury. []? Progressing: []? Met: []? Not Met: []? Adjusted  2. Patient will have a decrease in pain to facilitate improvement in movement, function, and ADLs as indicated by Functional Deficits. []? Progressing: []? Met: []? Not Met: []? Adjusted     Long Term Goals: To be achieved in: 6 weeks  1. Disability index score of 30% or less for the LEFS to assist with reaching prior level of function. []? Progressing: []? Met: []? Not Met: []? Adjusted  2. Patient will demonstrate increased AROM to WNL to allow for proper joint functioning as indicated by patients Functional Deficits. []? Progressing: []? Met: []? Not Met: []? Adjusted  3. Patient will demonstrate an increase in Strength to at least 4+/5 as well as good proximal hip strength and control to allow for proper functional mobility as indicated by patients Functional Deficits. []? Progressing: []? Met: []? Not Met: []? Adjusted  4. Patient will return to functional activities including ADLs, walking for 15+ minutes, stairs without increased symptoms or restriction. []? Progressing: []? Met: []? Not Met: []? Adjusted    Overall Progression Towards Functional goals/ Treatment Progress Update:  [] Patient is progressing as expected towards functional goals listed. [] Progression is slowed due to complexities/Impairments listed. [] Progression has been slowed due to co-morbidities.   [x] Plan just implemented, too soon to assess goals progression <30days   [] Goals require adjustment due to lack of progress  [] Patient is not progressing as expected and requires additional follow up with physician  [] Other    Persisting Functional Limitations/Impairments:  []Sitting []Standing   []Walking []Stairs   []Transfers []ADLs   []Squatting/bending []Kneeling  []Housework []Job related tasks  []Driving []Sports/Recreation   []Sleeping []Other:    ASSESSMENT:  See eval  Treatment/Activity Tolerance:  [x] Pt able to complete treatment [] Patient limited by fatique  [] Patient limited by pain  [] Patient limited by other medical complications  [] Other:     Prognosis: [x] Good [] Fair  [] Poor    Patient Requires Follow-up: [x] Yes  [] No          PLAN: See eval. PT 1-2x / week for 6 weeks. Build HEP at each treatment   [] Continue per plan of care [] Alter current plan (see comments)  [x] Plan of care initiated [] Hold pending MD visit [] Discharge    Electronically signed by: Richard Rogel, PT PT, DPT      Note: If patient does not return for scheduled/ recommended follow up visits, this note will serve as a discharge from care along with most recent update on progress.

## 2021-12-17 ENCOUNTER — HOSPITAL ENCOUNTER (OUTPATIENT)
Dept: PHYSICAL THERAPY | Age: 66
Setting detail: THERAPIES SERIES
Discharge: HOME | End: 2021-12-17
Payer: MEDICARE

## 2021-12-17 PROCEDURE — 97110 THERAPEUTIC EXERCISES: CPT

## 2021-12-17 PROCEDURE — 97530 THERAPEUTIC ACTIVITIES: CPT

## 2021-12-17 NOTE — FLOWSHEET NOTE
[address] [facility]  Phone: [phone]   Fax: [phone]    Physical Therapy Treatment Note/ Progress Report:     Date:  2021    Patient Name:  Sherlean Blizzard    :  1955  MRN: [id number]  Restrictions/Precautions:    Medical/Treatment Diagnosis Information:  Diagnosis: M16.12 (ICD-10-CM) - Primary osteoarthritis of left hip  Treatment Diagnosis: decreased ROM/strength of L hip , decreased gait abilities , impaired functional status. Insurance/Certification information:  PT Insurance Information: [facility]  Physician Information:  Referring Practitioner: Mirza Sullivan MD  Plan of care signed (Y/N): []  Yes [x]  No     Date of Patient follow up with Physician:      Progress Report: []  Yes  [x]  No     Date Range for reporting period:  Beginnin21  Ending:     Progress report due (10 Rx/or 30 days whichever is less): visit #10 or 49    Recertification due (POC duration/ or 90 days whichever is less): visit # 15 or  22    Visit # Insurance Allowable Auth required? Date Range   2 mn []  Yes  [x]  No        Latex Allergy:  [x]NO      []YES  Preferred Language for Healthcare:   [x]English       []other:    Functional Scale:        Date assessed:  LEFS: raw score = ; dysfunction = %   DNC at eval     Pain level:  5-8/10     SUBJECTIVE:  Pt reports she has been doing HEP, feeling slightly better but today is not a good day. Pt gets hip injection next Tuesday.      OBJECTIVE: See eval      RESTRICTIONS/PRECAUTIONS:     Exercises/Interventions:     Therapeutic Exercise (72533)  Resistance / level Sets/sec Reps Notes / Cues   bike       IB       Supine HSS flossing   5'' 20    SLR  2 10    S/L clamshells BLUE 2 10    S/L abd   2 10    Standing HS curl 3# 2 10    LAQ with BS 3# 2 10                                                            Therapeutic Activities (87047)       Step ups fwd  Lateral  6 in   6in 2  1 5  5    Mini squats   1 5    Standing TB abd GREEN 2 10                                Neuromuscular Re-ed (91767)       SLS                                                 Manual Intervention (70143)       Knee mobs/PROM       Tib/Fem Mobs       Patella Mobs       Ankle mobs                         Modalities:     Pt. Education:  -pt educated on diagnosis, prognosis and expectations for rehab  -all pt questions were answered    Home Exercise Program:  Access Code: [email or web address]  URL: [email or web address]  Date: 12/08/2021  Prepared by: Ashley Mcfarland    Exercises - performed at eval x10  Supine Bridge - 1 x daily - 7 x weekly - 3 sets - 10 reps  Supine Hip Adduction Isometric with Ball - 1 x daily - 7 x weekly - 3 sets - 10 reps  Supine Active Straight Leg Raise - 1 x daily - 7 x weekly - 3 sets - 10 reps  Seated Hamstring Stretch - 1 x daily - 7 x weekly - 3 sets - 10 reps      Therapeutic Exercise and NMR EXR  [x] (30800) Provided verbal/tactile cueing for activities related to strengthening, flexibility, endurance, ROM for improvements in LE, proximal hip, and core control with self care, mobility, lifting, ambulation.  [] (99267) Provided verbal/tactile cueing for activities related to improving balance, coordination, kinesthetic sense, posture, motor skill, proprioception  to assist with LE, proximal hip, and core control in self care, mobility, lifting, ambulation and eccentric single leg control.   [] (02120) Therapist is in constant attendance of 2 or more patients providing skilled therapy interventions, but not providing any significant amount of measurable one-on-one time to either patient, for improvements in LE, proximal hip, and core control in self care, mobility, lifting, ambulation and eccentric single leg control.      NMR and Therapeutic Activities:    [] (03895 or 72935) Provided verbal/tactile cueing for activities related to improving balance, coordination, kinesthetic sense, posture, motor skill, proprioception and motor activation to allow for proper function of core, proximal hip and LE with self care and ADLs  [] (59900) Gait Re-education- Provided training and instruction to the patient for proper LE, core and proximal hip recruitment and positioning and eccentric body weight control with ambulation re-education including up and down stairs     Home Exercise Program:    [x] (76603) Reviewed/Progressed HEP activities related to strengthening, flexibility, endurance, ROM of core, proximal hip and LE for functional self-care, mobility, lifting and ambulation/stair navigation   [] (28101)Reviewed/Progressed HEP activities related to improving balance, coordination, kinesthetic sense, posture, motor skill, proprioception of core, proximal hip and LE for self care, mobility, lifting, and ambulation/stair navigation      Manual Treatments:  PROM / STM / Oscillations-Mobs:  G-I, II, III, IV (PA's, Inf., Post.)  [] (58346) Provided manual therapy to mobilize LE, proximal hip and/or LS spine soft tissue/joints for the purpose of modulating pain, promoting relaxation,  increasing ROM, reducing/eliminating soft tissue swelling/inflammation/restriction, improving soft tissue extensibility and allowing for proper ROM for normal function with self care, mobility, lifting and ambulation. Modalities:  [] (81919) Vasopneumatic compression: Utilized vasopneumatic compression to decrease edema / swelling for the purpose of improving mobility and quad tone / recruitment which will allow for increased overall function including but not limited to self-care, transfers, ambulation, and ascending / descending stairs.        Charges:  Timed Code Treatment Minutes: 30   Total Treatment Minutes: 30     [] EVAL - LOW (40492)   [] EVAL - MOD (69499)  [] EVAL - HIGH (73129)  [] RE-EVAL (00910)  [x] MS(30307) x  1    [] Ionto  [] NMR (94922) x      [] Vaso  [] Manual (12703) x      [] Ultrasound  [x] TA x 1        [] Mech Traction (42793)  [] Aquatic Therapy x     [] ES (un) (82827):   [] Home Management Training x [] ES(attended) (38621)   [] Group:     [] Other:     GOALS:  Patient stated goal: decrease pain   []? Progressing: []? Met: []? Not Met: []? Adjusted     Therapist goals for Patient:   Short Term Goals: To be achieved in: 2 weeks  1. Independent in HEP and progression per patient tolerance, in order to prevent re-injury. []? Progressing: []? Met: []? Not Met: []? Adjusted  2. Patient will have a decrease in pain to facilitate improvement in movement, function, and ADLs as indicated by Functional Deficits. []? Progressing: []? Met: []? Not Met: []? Adjusted     Long Term Goals: To be achieved in: 6 weeks  1. Disability index score of 30% or less for the LEFS to assist with reaching prior level of function. []? Progressing: []? Met: []? Not Met: []? Adjusted  2. Patient will demonstrate increased AROM to WNL to allow for proper joint functioning as indicated by patients Functional Deficits. []? Progressing: []? Met: []? Not Met: []? Adjusted  3. Patient will demonstrate an increase in Strength to at least 4+/5 as well as good proximal hip strength and control to allow for proper functional mobility as indicated by patients Functional Deficits. []? Progressing: []? Met: []? Not Met: []? Adjusted  4. Patient will return to functional activities including ADLs, walking for 15+ minutes, stairs without increased symptoms or restriction. []? Progressing: []? Met: []? Not Met: []? Adjusted    Overall Progression Towards Functional goals/ Treatment Progress Update:  [] Patient is progressing as expected towards functional goals listed. [] Progression is slowed due to complexities/Impairments listed. [] Progression has been slowed due to co-morbidities.   [x] Plan just implemented, too soon to assess goals progression <30days   [] Goals require adjustment due to lack of progress  [] Patient is not progressing as expected and requires additional follow up with physician  [] Other    Persisting Functional Limitations/Impairments:  []Sitting []Standing   []Walking []Stairs   []Transfers []ADLs   []Squatting/bending []Kneeling  []Housework []Job related tasks  []Driving []Sports/Recreation   []Sleeping []Other:    ASSESSMENT:  Pt late to today appt but still able to get some treatment in as pt in more pain today. Pt able to complete the above with minimal increases in pain. Did have some hip popping with mini squats. Continue to progress as tolerated. Treatment/Activity Tolerance:  [x] Pt able to complete treatment [] Patient limited by fatique  [] Patient limited by pain  [] Patient limited by other medical complications  [] Other:     Prognosis: [x] Good [] Fair  [] Poor    Patient Requires Follow-up: [x] Yes  [] No          PLAN: See eval. PT 1-2x / week for 6 weeks. Build HEP at each treatment   [] Continue per plan of care [] Alter current plan (see comments)  [x] Plan of care initiated [] Hold pending MD visit [] Discharge    Electronically signed by: Ailin Elmore, PT PT, DPT      Note: If patient does not return for scheduled/ recommended follow up visits, this note will serve as a discharge from care along with most recent update on progress.

## 2021-12-20 ENCOUNTER — HOSPITAL ENCOUNTER (OUTPATIENT)
Dept: PHYSICAL THERAPY | Age: 66
Setting detail: THERAPIES SERIES
Discharge: HOME | End: 2021-12-20
Payer: MEDICARE

## 2021-12-20 PROCEDURE — 97530 THERAPEUTIC ACTIVITIES: CPT

## 2021-12-20 PROCEDURE — 97110 THERAPEUTIC EXERCISES: CPT

## 2021-12-20 NOTE — FLOWSHEET NOTE
[address] [facility]  Phone: [phone]   Fax: [phone]    Physical Therapy Treatment Note/ Progress Report:     Date:  2021    Patient Name:  Myranda Khan    :  1955  MRN: [id number]  Restrictions/Precautions:    Medical/Treatment Diagnosis Information:  Diagnosis: M16.12 (ICD-10-CM) - Primary osteoarthritis of left hip  Treatment Diagnosis: decreased ROM/strength of L hip , decreased gait abilities , impaired functional status. Insurance/Certification information:  PT Insurance Information: [employer]  Physician Information:  Referring Practitioner: Sagrario Mcclellan MD  Plan of care signed (Y/N): []  Yes [x]  No     Date of Patient follow up with Physician:      Progress Report: []  Yes  [x]  No     Date Range for reporting period:  Beginnin21  Ending:     Progress report due (10 Rx/or 30 days whichever is less): visit #10 or 03    Recertification due (POC duration/ or 90 days whichever is less): visit # 15 or  22    Visit # Insurance Allowable Auth required? Date Range   3 mn []  Yes  [x]  No        Latex Allergy:  [x]NO      []YES  Preferred Language for Healthcare:   [x]English       []other:    Functional Scale:        Date assessed:  LEFS: raw score = ; dysfunction = %   DNC at eval     Pain level:  6/10     SUBJECTIVE:  Feeling increased pain this morning, yesterday was pretty good. OBJECTIVE: See eval      RESTRICTIONS/PRECAUTIONS:     Exercises/Interventions:     Therapeutic Exercise (62406)  Resistance / level Sets/sec Reps Notes / Cues   bike  4'     IB       Supine HSS flossing   5'' 20    SLR  2 10    S/L clamshells BLUE 2 10 Cueing for improved technique to limit anterior groin pain.     S/L abd   2 10 Cueing for improved technique to limit anterior groin pain   Standing HS curl 3# 2 10    LAQ with BS 3# 2 10                                                            Therapeutic Activities (09239)       Step ups fwd  Lateral  6 in   6in 2  1 5  5    Mini squats   1 5    Standing TB abd GREEN 2 10                                Neuromuscular Re-ed (13594)       SLS  10\" 3 Added 12/20                                             Manual Intervention (41884)       Knee mobs/PROM       Tib/Fem Mobs       Patella Mobs       Ankle mobs                         Modalities:     Pt. Education:  -pt educated on diagnosis, prognosis and expectations for rehab  -all pt questions were answered    Home Exercise Program:  Access Code: [email or web address]  URL: [email or web address]  Date: 12/08/2021  Prepared by: Edelmira Prado    Exercises - performed at eval x10  Supine Bridge - 1 x daily - 7 x weekly - 3 sets - 10 reps  Supine Hip Adduction Isometric with Ball - 1 x daily - 7 x weekly - 3 sets - 10 reps  Supine Active Straight Leg Raise - 1 x daily - 7 x weekly - 3 sets - 10 reps  Seated Hamstring Stretch - 1 x daily - 7 x weekly - 3 sets - 10 reps      Therapeutic Exercise and NMR EXR  [x] (33664) Provided verbal/tactile cueing for activities related to strengthening, flexibility, endurance, ROM for improvements in LE, proximal hip, and core control with self care, mobility, lifting, ambulation.  [] (98562) Provided verbal/tactile cueing for activities related to improving balance, coordination, kinesthetic sense, posture, motor skill, proprioception  to assist with LE, proximal hip, and core control in self care, mobility, lifting, ambulation and eccentric single leg control.   [] (62879) Therapist is in constant attendance of 2 or more patients providing skilled therapy interventions, but not providing any significant amount of measurable one-on-one time to either patient, for improvements in LE, proximal hip, and core control in self care, mobility, lifting, ambulation and eccentric single leg control.      NMR and Therapeutic Activities:    [] (32092 or 80363) Provided verbal/tactile cueing for activities related to improving balance, coordination, kinesthetic sense, posture, motor skill, proprioception and motor activation to allow for proper function of core, proximal hip and LE with self care and ADLs  [] (20967) Gait Re-education- Provided training and instruction to the patient for proper LE, core and proximal hip recruitment and positioning and eccentric body weight control with ambulation re-education including up and down stairs     Home Exercise Program:    [x] (45540) Reviewed/Progressed HEP activities related to strengthening, flexibility, endurance, ROM of core, proximal hip and LE for functional self-care, mobility, lifting and ambulation/stair navigation   [] (87851)Reviewed/Progressed HEP activities related to improving balance, coordination, kinesthetic sense, posture, motor skill, proprioception of core, proximal hip and LE for self care, mobility, lifting, and ambulation/stair navigation      Manual Treatments:  PROM / STM / Oscillations-Mobs:  G-I, II, III, IV (PA's, Inf., Post.)  [] (76407) Provided manual therapy to mobilize LE, proximal hip and/or LS spine soft tissue/joints for the purpose of modulating pain, promoting relaxation,  increasing ROM, reducing/eliminating soft tissue swelling/inflammation/restriction, improving soft tissue extensibility and allowing for proper ROM for normal function with self care, mobility, lifting and ambulation. Modalities:  [] (43854) Vasopneumatic compression: Utilized vasopneumatic compression to decrease edema / swelling for the purpose of improving mobility and quad tone / recruitment which will allow for increased overall function including but not limited to self-care, transfers, ambulation, and ascending / descending stairs.        Charges:  Timed Code Treatment Minutes: 35   Total Treatment Minutes: 35     [] EVAL - LOW (41463)   [] EVAL - MOD (99154)  [] EVAL - HIGH (87132)  [] RE-EVAL (43900)  [x] KI(31628) x  1    [] Ionto  [] NMR (01591) x      [] Vaso  [] Manual (12322) x [] Ultrasound  [x] TA x 1        [] [facility] (16757)  [] Aquatic Therapy x     [] ES (un) (21668):   [] Home Management Training x [] ES(attended) (44267)   [] Group:     [] Other:     GOALS:  Patient stated goal: decrease pain   []? Progressing: []? Met: []? Not Met: []? Adjusted     Therapist goals for Patient:   Short Term Goals: To be achieved in: 2 weeks  1. Independent in HEP and progression per patient tolerance, in order to prevent re-injury. []? Progressing: []? Met: []? Not Met: []? Adjusted  2. Patient will have a decrease in pain to facilitate improvement in movement, function, and ADLs as indicated by Functional Deficits. []? Progressing: []? Met: []? Not Met: []? Adjusted     Long Term Goals: To be achieved in: 6 weeks  1. Disability index score of 30% or less for the LEFS to assist with reaching prior level of function. []? Progressing: []? Met: []? Not Met: []? Adjusted  2. Patient will demonstrate increased AROM to WNL to allow for proper joint functioning as indicated by patients Functional Deficits. []? Progressing: []? Met: []? Not Met: []? Adjusted  3. Patient will demonstrate an increase in Strength to at least 4+/5 as well as good proximal hip strength and control to allow for proper functional mobility as indicated by patients Functional Deficits. []? Progressing: []? Met: []? Not Met: []? Adjusted  4. Patient will return to functional activities including ADLs, walking for 15+ minutes, stairs without increased symptoms or restriction. []? Progressing: []? Met: []? Not Met: []? Adjusted    Overall Progression Towards Functional goals/ Treatment Progress Update:  [] Patient is progressing as expected towards functional goals listed. [] Progression is slowed due to complexities/Impairments listed. [] Progression has been slowed due to co-morbidities.   [x] Plan just implemented, too soon to assess goals progression <30days   [] Goals require adjustment due to lack of progress  [] Patient is not progressing as expected and requires additional follow up with physician  [] Other    Persisting Functional Limitations/Impairments:  []Sitting []Standing   []Walking []Stairs   []Transfers []ADLs   []Squatting/bending []Kneeling  []Housework []Job related tasks  []Driving []Sports/Recreation   []Sleeping []Other:    ASSESSMENT:  Pt late to today appt but still able to get some treatment in as pt in more pain today. Pt able to complete the above with minimal increases in pain however, pain diminished with corrected mechanics. Did have some hip popping with mini squats. Continue to progress as tolerated for progression toward improved hip strength and balance to tolerate daily weight bearing tasks without pain and restrictions. Treatment/Activity Tolerance:  [x] Pt able to complete treatment [] Patient limited by fatique  [] Patient limited by pain  [] Patient limited by other medical complications  [] Other:     Prognosis: [x] Good [] Fair  [] Poor    Patient Requires Follow-up: [x] Yes  [] No          PLAN: See eval. PT 1-2x / week for 6 weeks. Build HEP at each treatment   [] Continue per plan of care [] Alter current plan (see comments)  [x] Plan of care initiated [] Hold pending MD visit [] Discharge    Electronically signed by: Tosha Murphy PTA [id number]      Note: If patient does not return for scheduled/ recommended follow up visits, this note will serve as a discharge from care along with most recent update on progress.

## 2021-12-21 ENCOUNTER — OFFICE VISIT (OUTPATIENT)
Dept: ORTHOPEDIC SURGERY | Age: 66
End: 2021-12-21
Payer: MEDICARE

## 2021-12-21 VITALS — BODY MASS INDEX: 40.48 KG/M2 | WEIGHT: 220 LBS | HEIGHT: 62 IN

## 2021-12-21 DIAGNOSIS — M16.12 PRIMARY OSTEOARTHRITIS OF LEFT HIP: Primary | ICD-10-CM

## 2021-12-21 PROCEDURE — 20611 DRAIN/INJ JOINT/BURSA W/US: CPT | Performed by: ORTHOPAEDIC SURGERY

## 2021-12-21 RX ORDER — LIDOCAINE HYDROCHLORIDE 10 MG/ML
4 INJECTION, SOLUTION INFILTRATION; PERINEURAL ONCE
Status: COMPLETED | OUTPATIENT
Start: 2021-12-21 | End: 2021-12-21

## 2021-12-21 RX ORDER — METHYLPREDNISOLONE ACETATE 40 MG/ML
80 INJECTION, SUSPENSION INTRA-ARTICULAR; INTRALESIONAL; INTRAMUSCULAR; SOFT TISSUE ONCE
Status: COMPLETED | OUTPATIENT
Start: 2021-12-21 | End: 2021-12-21

## 2021-12-21 RX ADMIN — LIDOCAINE HYDROCHLORIDE 4 ML: 10 INJECTION, SOLUTION INFILTRATION; PERINEURAL at 08:56

## 2021-12-21 RX ADMIN — METHYLPREDNISOLONE ACETATE 80 MG: 40 INJECTION, SUSPENSION INTRA-ARTICULAR; INTRALESIONAL; INTRAMUSCULAR; SOFT TISSUE at 08:58

## 2021-12-21 NOTE — PATIENT INSTRUCTIONS
Impression:  left hip osteoarthritis. Plan:  1. Injection with cortisone was recommended into  left   hip joint using ultrasound visualization. She understands the risks and benefits of the injection and describes no potential allergies. Time out was performed to verify correct person, correct procedure, and correct site. 2. Ultrasound visualization was first performed utilizing the [facility] Ultrasound unit using an 5/2 MHz Curved Probe. finding the femoral neck head junction. Next the femoral artery and vein were visualized with ultrasound medial to the femoral head. The location of the needle insertion was determined well lateral to the neurovascular structures and the site was anesthetized with 3 mL of 1% Lidocaine. The site was then prepped with ChloraPrep. A sterile cover was placed over the transducer head and the femoral head neck junction once again visualized. A 20-gauge spinal needle was then introduced under ultrasound control to the head neck junction. Once the needle was intracapsular the hip joint was injected with 2 mL  of 1% Lidocaine mixed with 2 ml of 40 mg Depo Medrol. Parris tolerated the procedure well and  did report 50%  relief of  hip pain within 5 minutes of the injection. 3. She is returned to Dr. Gianni Davis [facility].       Lai Hightower MD  12/21/2021

## 2021-12-21 NOTE — PROGRESS NOTES
Lidocaine. The site was then prepped with ChloraPrep. A sterile cover was placed over the transducer head and the femoral head neck junction once again visualized. A 20-gauge spinal needle was then introduced under ultrasound control to the head neck junction. Once the needle was intracapsular the hip joint was injected with 2 mL  of 1% Lidocaine mixed with 2 ml of 40 mg Depo Medrol. Parris tolerated the procedure well and  did report 50%  relief of  hip pain within 5 minutes of the injection. 3. She is returned to Dr. Stella Bautista care.       Watson Burnette MD  12/21/2021

## 2021-12-27 ENCOUNTER — HOSPITAL ENCOUNTER (OUTPATIENT)
Dept: PHYSICAL THERAPY | Age: 66
Setting detail: THERAPIES SERIES
Discharge: HOME | End: 2021-12-27
Payer: MEDICARE

## 2021-12-27 DIAGNOSIS — I10 ESSENTIAL HYPERTENSION: ICD-10-CM

## 2021-12-27 DIAGNOSIS — K21.9 GASTROESOPHAGEAL REFLUX DISEASE, UNSPECIFIED WHETHER ESOPHAGITIS PRESENT: ICD-10-CM

## 2021-12-27 NOTE — FLOWSHEET NOTE
[address] [facility]    Physical Therapy  Cancellation/No-show Note  Patient Name:  Tiffany Landaverde  :  1955   Date:  2021    Cancelled visits to date: 0  No-shows to date: 1      For today's appointment patient:  []  Cancelled  []  Rescheduled appointment  [x]  No-show     Reason given by patient:  []  Patient ill  []  Conflicting appointment  []  No transportation    []  Conflict with work  []  No reason given  [x]  Other:  Pt has been sick the last few days and forgot about appt today. Will plan on being here for next visit later in the week. Comments:      Phone call information:   [x]  Phone call made today to patient at _ time at number provided:[phone]       [x]  Patient answered, conversation as follows:  Missed appt today, next scheduled appt on 2021 at 8:15 am   []  Patient did not answer, message left as follows:  []  Phone call not made today  []  Phone call not needed - pt contacted us to cancel and provided reason for cancellation.      Electronically signed by:  Sruthi Salgado PTA

## 2021-12-28 RX ORDER — PANTOPRAZOLE SODIUM 40 MG/1
TABLET, DELAYED RELEASE ORAL
Qty: 90 TABLET | Refills: 0 | Status: SHIPPED | OUTPATIENT
Start: 2021-12-28 | End: 2022-04-06 | Stop reason: SDUPTHER

## 2021-12-28 RX ORDER — LOSARTAN POTASSIUM AND HYDROCHLOROTHIAZIDE 25; 100 MG/1; MG/1
TABLET ORAL
Qty: 90 TABLET | Refills: 0 | Status: SHIPPED
Start: 2021-12-28 | End: 2022-01-03 | Stop reason: ALTCHOICE

## 2021-12-29 ENCOUNTER — HOSPITAL ENCOUNTER (OUTPATIENT)
Dept: PHYSICAL THERAPY | Age: 66
Setting detail: THERAPIES SERIES
Discharge: HOME | End: 2021-12-29
Payer: MEDICARE

## 2021-12-29 NOTE — FLOWSHEET NOTE
[address] [facility]    Physical Therapy  Cancellation/No-show Note  Patient Name:  Ramiro Marrero  :  1955   Date:  2021    Cancelled visits to date:   No-shows to date:     For today's appointment patient:  [x]  Cancelled  []  Rescheduled appointment  []  No-show     Reason given by patient:  []  Patient ill  []  Conflicting appointment  []  No transportation    []  Conflict with work  []  No reason given  [x]  Other:  Pt's son just returned home from [place] last night    Comments:      Phone call information:   []  Phone call made today to patient at _ time at number provided:[phone]       []  Patient answered, conversation as follows:  Missed appt today, next scheduled appt on 2021 at 8:15 am   []  Patient did not answer, message left as follows:  []  Phone call not made today  [x]  Phone call not needed - pt contacted us to cancel and provided reason for cancellation.      Electronically signed by:  Leonela Baker PTA Cigarettes

## 2022-01-03 ENCOUNTER — TELEPHONE (OUTPATIENT)
Dept: INTERNAL MEDICINE CLINIC | Age: 67
End: 2022-01-03

## 2022-01-03 ENCOUNTER — HOSPITAL ENCOUNTER (OUTPATIENT)
Dept: PHYSICAL THERAPY | Age: 67
Setting detail: THERAPIES SERIES
Discharge: HOME | End: 2022-01-03

## 2022-01-03 RX ORDER — HYDROCHLOROTHIAZIDE 25 MG/1
25 TABLET ORAL EVERY MORNING
Qty: 90 TABLET | Refills: 1 | Status: SHIPPED | OUTPATIENT
Start: 2022-01-03 | End: 2022-06-30 | Stop reason: SDUPTHER

## 2022-01-03 RX ORDER — LOSARTAN POTASSIUM 100 MG/1
100 TABLET ORAL DAILY
Qty: 90 TABLET | Refills: 1 | Status: SHIPPED | OUTPATIENT
Start: 2022-01-03 | End: 2022-06-30 | Stop reason: SDUPTHER

## 2022-01-03 NOTE — TELEPHONE ENCOUNTER
----- Message from Kenzie Henriquez sent at 1/3/2022 10:23 AM EST -----  Subject: Message to Provider    QUESTIONS  Information for Provider? SE attempted to call the office several times   and the phone just rang. PT. called today regarding her BP medication,   stating that she has been out for 5 days. PT. states the pharmacy has   attempted to call as well and hasn't heard back from the provider. [address] doesn't have her medication in stock. The pharmacy wants to know   if the doctor will approve two separate medicines. Please call the pt.   back asap to advise.  ---------------------------------------------------------------------------  --------------  CALL BACK INFO  What is the best way for the office to contact you? OK to leave message on   voicemail  Preferred Call Back Phone Number? [phone]  ---------------------------------------------------------------------------  --------------  SCRIPT ANSWERS  Relationship to Patient?  Self

## 2022-01-03 NOTE — FLOWSHEET NOTE
[address] [facility]    Physical Therapy  Cancellation/No-show Note  Patient Name:  Shiv Underwood  :  1955   Date:  1/3/2022    Cancelled visits to date:  , 1/3  No-shows to date:     For today's appointment patient:  [x]  Cancelled  []  Rescheduled appointment  []  No-show     Reason given by patient:  []  Patient ill  [x]  Conflicting appointment  []  No transportation    []  Conflict with work  []  No reason given  []  Other:     Comments:      Phone call information:   []  Phone call made today to patient at _ time at number provided:[phone]       []  Patient answered, conversation as follows:  Missed appt today, next scheduled appt on 2021 at 8:15 am   []  Patient did not answer, message left as follows:  []  Phone call not made today  [x]  Phone call not needed - pt contacted us to cancel and provided reason for cancellation.      Electronically signed by:  Selena Higginbotham PT,

## 2022-01-20 DIAGNOSIS — F41.1 GAD (GENERALIZED ANXIETY DISORDER): ICD-10-CM

## 2022-01-21 RX ORDER — ESCITALOPRAM OXALATE 20 MG/1
TABLET ORAL
Qty: 90 TABLET | Refills: 0 | Status: SHIPPED | OUTPATIENT
Start: 2022-01-21 | End: 2022-04-25 | Stop reason: SDUPTHER

## 2022-01-31 ENCOUNTER — OFFICE VISIT (OUTPATIENT)
Dept: INTERNAL MEDICINE CLINIC | Age: 67
End: 2022-01-31
Payer: MEDICARE

## 2022-01-31 VITALS
WEIGHT: 215 LBS | HEART RATE: 72 BPM | HEIGHT: 62 IN | SYSTOLIC BLOOD PRESSURE: 122 MMHG | DIASTOLIC BLOOD PRESSURE: 82 MMHG | BODY MASS INDEX: 39.56 KG/M2

## 2022-01-31 DIAGNOSIS — E66.01 CLASS 2 SEVERE OBESITY DUE TO EXCESS CALORIES WITH SERIOUS COMORBIDITY AND BODY MASS INDEX (BMI) OF 39.0 TO 39.9 IN ADULT (HCC): ICD-10-CM

## 2022-01-31 DIAGNOSIS — I10 PRIMARY HYPERTENSION: ICD-10-CM

## 2022-01-31 DIAGNOSIS — Z01.818 PRE-OPERATIVE CLEARANCE: Primary | ICD-10-CM

## 2022-01-31 DIAGNOSIS — M16.12 ARTHRITIS OF LEFT HIP: ICD-10-CM

## 2022-01-31 DIAGNOSIS — R73.9 HYPERGLYCEMIA: ICD-10-CM

## 2022-01-31 LAB
ANION GAP SERPL CALCULATED.3IONS-SCNC: 14 MMOL/L (ref 3–16)
APTT: 31.6 SEC (ref 26.2–38.6)
BASOPHILS ABSOLUTE: 0 K/UL (ref 0–0.2)
BASOPHILS RELATIVE PERCENT: 0.5 %
BILIRUBIN URINE: NEGATIVE
BLOOD, URINE: NEGATIVE
BUN BLDV-MCNC: 23 MG/DL (ref 7–20)
CALCIUM SERPL-MCNC: 9.1 MG/DL (ref 8.3–10.6)
CHLORIDE BLD-SCNC: 99 MMOL/L (ref 99–110)
CLARITY: CLEAR
CO2: 27 MMOL/L (ref 21–32)
COLOR: YELLOW
CREAT SERPL-MCNC: 1 MG/DL (ref 0.6–1.2)
EOSINOPHILS ABSOLUTE: 0.2 K/UL (ref 0–0.6)
EOSINOPHILS RELATIVE PERCENT: 3.9 %
GFR AFRICAN AMERICAN: >60
GFR NON-AFRICAN AMERICAN: 55
GLUCOSE BLD-MCNC: 97 MG/DL (ref 70–99)
GLUCOSE URINE: NEGATIVE MG/DL
HCT VFR BLD CALC: 43.9 % (ref 36–48)
HEMOGLOBIN: 14.5 G/DL (ref 12–16)
INR BLD: 0.97 (ref 0.88–1.12)
KETONES, URINE: NEGATIVE MG/DL
LEUKOCYTE ESTERASE, URINE: NEGATIVE
LYMPHOCYTES ABSOLUTE: 2.3 K/UL (ref 1–5.1)
LYMPHOCYTES RELATIVE PERCENT: 40 %
MCH RBC QN AUTO: 29.4 PG (ref 26–34)
MCHC RBC AUTO-ENTMCNC: 33 G/DL (ref 31–36)
MCV RBC AUTO: 89.2 FL (ref 80–100)
MICROSCOPIC EXAMINATION: NORMAL
MONOCYTES ABSOLUTE: 0.7 K/UL (ref 0–1.3)
MONOCYTES RELATIVE PERCENT: 12.8 %
NEUTROPHILS ABSOLUTE: 2.5 K/UL (ref 1.7–7.7)
NEUTROPHILS RELATIVE PERCENT: 42.8 %
NITRITE, URINE: NEGATIVE
PDW BLD-RTO: 13.6 % (ref 12.4–15.4)
PH UA: 6.5 (ref 5–8)
PLATELET # BLD: 230 K/UL (ref 135–450)
PMV BLD AUTO: 8.2 FL (ref 5–10.5)
POTASSIUM SERPL-SCNC: 3.8 MMOL/L (ref 3.5–5.1)
PROTEIN UA: NEGATIVE MG/DL
PROTHROMBIN TIME: 11 SEC (ref 9.9–12.7)
RBC # BLD: 4.92 M/UL (ref 4–5.2)
SODIUM BLD-SCNC: 140 MMOL/L (ref 136–145)
SPECIFIC GRAVITY UA: 1.03 (ref 1–1.03)
URINE REFLEX TO CULTURE: NORMAL
URINE TYPE: NORMAL
UROBILINOGEN, URINE: 1 E.U./DL
WBC # BLD: 5.7 K/UL (ref 4–11)

## 2022-01-31 PROCEDURE — G8399 PT W/DXA RESULTS DOCUMENT: HCPCS | Performed by: FAMILY MEDICINE

## 2022-01-31 PROCEDURE — G8417 CALC BMI ABV UP PARAM F/U: HCPCS | Performed by: FAMILY MEDICINE

## 2022-01-31 PROCEDURE — 3017F COLORECTAL CA SCREEN DOC REV: CPT | Performed by: FAMILY MEDICINE

## 2022-01-31 PROCEDURE — 1036F TOBACCO NON-USER: CPT | Performed by: FAMILY MEDICINE

## 2022-01-31 PROCEDURE — 99214 OFFICE O/P EST MOD 30 MIN: CPT | Performed by: FAMILY MEDICINE

## 2022-01-31 PROCEDURE — 1090F PRES/ABSN URINE INCON ASSESS: CPT | Performed by: FAMILY MEDICINE

## 2022-01-31 PROCEDURE — 1123F ACP DISCUSS/DSCN MKR DOCD: CPT | Performed by: FAMILY MEDICINE

## 2022-01-31 PROCEDURE — G8427 DOCREV CUR MEDS BY ELIG CLIN: HCPCS | Performed by: FAMILY MEDICINE

## 2022-01-31 PROCEDURE — G8484 FLU IMMUNIZE NO ADMIN: HCPCS | Performed by: FAMILY MEDICINE

## 2022-01-31 PROCEDURE — 4040F PNEUMOC VAC/ADMIN/RCVD: CPT | Performed by: FAMILY MEDICINE

## 2022-01-31 PROCEDURE — 93000 ELECTROCARDIOGRAM COMPLETE: CPT | Performed by: FAMILY MEDICINE

## 2022-01-31 NOTE — PROGRESS NOTES
Chief Complaint   Patient presents with    Pre-op Exam     Total left hip replacement on 22     Preoperative examination  Tin Ospina  77 y.o. [place] female  1955    This patient presents today at the request of the surgeon for a preoperative consultation. Surgeon:  Dr. Norman Never with [facility] ortho   Indication for surgery:  Left hip arthritis pain   Scheduled procedure:  Left total hip replacement. Date of surgery:  22  Previous anesthesia complications:  Has PONV otherwise No  Family history of thrombophilia or hemophilia:  Yes,  Brother with recurrent DVTs.    Recent chest pain or SOB:   No  Recent infection or exposure to communicable disease: No  Chronic steroid use:  No  Home Infestations:  Bed Bugs, Lice:  No  MRSA history:  No   COVID vaccinated:  No.    Patient Active Problem List   Diagnosis    Postmastectomy lymphedema syndrome    GERD (gastroesophageal reflux disease)    Villous adenoma of colon    Hypertension    REBECA (generalized anxiety disorder)    History of cancer of right breast    Severe obesity with body mass index (BMI) of 35.0 to 39.9 with serious comorbidity (HCC)       Social History     Tobacco Use    Smoking status: Former Smoker     Packs/day: 1.00     Years: 40.00     Pack years: 40.00     Types: Cigarettes     Quit date: 2010     Years since quittin.5    Smokeless tobacco: Never Used   Vaping Use    Vaping Use: Never used   Substance Use Topics    Alcohol use: No     Comment: monthly at most    Drug use: No       Allergies   Allergen Reactions    Codeine      Nausea      Vicodin [Hydrocodone-Acetaminophen]      Itching         Outpatient Medications Marked as Taking for the 22 encounter (Office Visit) with Sean Villarreal MD   Medication Sig Dispense Refill    escitalopram (LEXAPRO) 20 MG tablet TAKE ONE TABLET BY MOUTH ONCE NIGHTLY 90 tablet 0    losartan (COZAAR) 100 MG tablet Take 1 tablet by mouth daily 90 tablet 1    hydroCHLOROthiazide (HYDRODIURIL) 25 MG tablet Take 1 tablet by mouth every morning 90 tablet 1    pantoprazole (PROTONIX) 40 MG tablet TAKE ONE TABLET BY MOUTH DAILY 90 tablet 0    famotidine (PEPCID) 20 MG tablet Take 1 tablet nightly and may take a second pill if needed. 60 tablet prn    Black Elderberry 50 MG/5ML SYRP Take by mouth daily 1 tablespoon daily         Past Medical History:   Diagnosis Date    Anxiety     Breast cancer ([address] 2010    Right breast 2010;  stage 1    Colon polyps     GERD (gastroesophageal reflux disease)     Hernia, hiatal     Hypertension     Osteoarthritis     hands    Postmastectomy lymphedema syndrome     Reactive depression (situational)     stress related/ WELL CONTROLLED AS OF 12    Shingles 2014    left forehead;  2nd episode       Past Surgical History:   Procedure Laterality Date    ABLATION OF DYSRHYTHMIC FOCUS      BREAST BIOPSY      BREAST RECONSTRUCTION      Right breast:  Dr. Hira Kapoor      both hands   [facility]    COLONOSCOPY  2012    [place]    FOOT FRACTURE SURGERY   and     FOOT SURGERY      RIGHT X 3    LAPAROSCOPIC APPENDECTOMY  12    MASTECTOMY, MODIFIED RADICAL  04/27/10    right    RIGHT COLECTOMY  12    Dr. Phillip Mancilla       Family History   Problem Relation Age of Onset    Lung Cancer Mother     Heart Disease Mother     Lung Cancer Father     Endometrial Cancer Sister         62s;  caught early    Diabetes Maternal Grandfather     Deep Vein Thrombosis Brother          of COVID - [place]         Review of Systems - Full ROS negative except for the following positives:   Left hip pain. Objective:   Blood pressure 122/82, pulse 72, height 5' 2\" (1.575 m), weight 215 lb (97.5 kg). Body mass index is 39.32 kg/m². Constitutional: Oriented to person, place, and time.  Appears well-developed and well-nourished in no acute distress. HEENT: Head normocephalic. Ears clear. Nose clear. Mouth/Throat: Oropharynx is clear and moist.   Neck: Neck supple. Trachea in midline. No thyromegaly  Cardiovascular: Normal rate, regular rhythm. Normal heart sounds. Intact distal pulses. Pulmonary/Chest: Effort normal.  Breath sounds normal and clear. Abdominal: Soft. Bowel sounds are normal.  Non-tender. No hepatomegaly or bruit. Musculoskeletal: normal gait. No soft tissue swelling, erythema or warm joints. Neurological: CN II thru XII grossly intact. Normal tone and strength. Skin: Skin is warm and dry. No rash. No pallor or jaundice. Psychiatric: Normal affect and cognition. ASSESSMENT and Plan:  1. Pre-operative clearance  Moderate risk surgery in patient with stable medical risk factors. - EKG 12 Lead  - Urinalysis Reflex to Culture  - CBC Auto Differential  - Basic Metabolic Panel  - Hemoglobin A1C  - Protime-INR  - APTT    2. Arthritis of left hip  For surgical treatment. 3. Primary hypertension  BP: 122/82   At goal and meeting medical guidelines. Continue treatment. 4. Class 2 severe obesity due to excess calories with serious comorbidity and body mass index (BMI) of 39.0 to 39.9 in adult ([facility] [address]      5. Hyperglycemia  Prior A1Cs were normal but since labs today not fasting and BMI almost 40, will check due to need for total joint.  - Hemoglobin A1C        1. Preoperative workup as follows: EKG and labs as above    2. Change in medication regimen before surgery: HOLD LOSARTAN DAY OF SURGERY AND RESUME WHEN BP IS STABLE. 3. Prophylaxis for cardiac events with perioperative beta-blockers: Not indicated     4. Deep vein thrombosis prophylaxis: regimen to be chosen by surgical team if appropriate for type of surgery. No contraindications to planned surgery.        Signature:      Noel Carrion MD  1/31/2022

## 2022-02-01 LAB
ESTIMATED AVERAGE GLUCOSE: 105.4 MG/DL
HBA1C MFR BLD: 5.3 %

## 2022-02-01 NOTE — RESULT ENCOUNTER NOTE
Call patient:  labs and urine are good but not drinking enough water. Also copy and send with pre-op information.

## 2022-04-06 ENCOUNTER — TELEPHONE (OUTPATIENT)
Dept: INTERNAL MEDICINE CLINIC | Age: 67
End: 2022-04-06

## 2022-04-06 DIAGNOSIS — K21.9 GASTROESOPHAGEAL REFLUX DISEASE, UNSPECIFIED WHETHER ESOPHAGITIS PRESENT: ICD-10-CM

## 2022-04-06 RX ORDER — PANTOPRAZOLE SODIUM 40 MG/1
TABLET, DELAYED RELEASE ORAL
Qty: 90 TABLET | Refills: 3 | Status: SHIPPED | OUTPATIENT
Start: 2022-04-06

## 2022-04-06 NOTE — TELEPHONE ENCOUNTER
Pt  calling requesting refill of Pantoprazole     Last written 12/28/21  Last OV 1/31/22  Next OV none  Last recommended OV NA  Please send to [facility]

## 2022-04-25 ENCOUNTER — TELEPHONE (OUTPATIENT)
Dept: INTERNAL MEDICINE CLINIC | Age: 67
End: 2022-04-25

## 2022-04-25 DIAGNOSIS — F41.1 GAD (GENERALIZED ANXIETY DISORDER): ICD-10-CM

## 2022-04-25 RX ORDER — ESCITALOPRAM OXALATE 20 MG/1
TABLET ORAL
Qty: 90 TABLET | Refills: 2 | Status: SHIPPED | OUTPATIENT
Start: 2022-04-25 | End: 2022-07-11

## 2022-06-30 ENCOUNTER — TELEPHONE (OUTPATIENT)
Dept: INTERNAL MEDICINE CLINIC | Age: 67
End: 2022-06-30

## 2022-06-30 RX ORDER — HYDROCHLOROTHIAZIDE 25 MG/1
25 TABLET ORAL EVERY MORNING
Qty: 90 TABLET | Refills: 1 | Status: SHIPPED | OUTPATIENT
Start: 2022-06-30

## 2022-06-30 RX ORDER — LOSARTAN POTASSIUM 100 MG/1
100 TABLET ORAL DAILY
Qty: 90 TABLET | Refills: 1 | Status: SHIPPED | OUTPATIENT
Start: 2022-06-30

## 2022-06-30 NOTE — TELEPHONE ENCOUNTER
Pt  calling requesting refill of Hydrochlorothizide and Losartan     Last written 1/3/22  Last OV   1/31/22  Next OV none   Last recommended OV NA    Please send to   [facility]

## 2022-07-05 ENCOUNTER — TELEPHONE (OUTPATIENT)
Dept: INTERNAL MEDICINE CLINIC | Age: 67
End: 2022-07-05

## 2022-07-05 ENCOUNTER — NURSE TRIAGE (OUTPATIENT)
Dept: OTHER | Facility: CLINIC | Age: 67
End: 2022-07-05

## 2022-07-05 NOTE — TELEPHONE ENCOUNTER
Spoke with pt and scheduled her to see Dr Gurvinder Bhandari tomorrow in the office per Dr Gurvinder Bhandari.

## 2022-07-05 NOTE — TELEPHONE ENCOUNTER
Pt calling with right ankle swelling---came through nurse triage---offered appt for today but couldn't do--wants to see you will in the am to try to get a same day appt---thanks.

## 2022-07-05 NOTE — TELEPHONE ENCOUNTER
Received call from [facility] with Red Flag Complaint. Subjective: Caller states \"I have swelling in my right leg\"     Current Symptoms: swelling in right ankle- comes and goes- worse at the end of the day    Onset: 5 months ago; waxing and waning    Pain Severity: 0/10; N/A; none    Temperature: denies     What has been tried: NA    Recommended disposition: Go to Office Now    Care advice provided, patient verbalizes understanding; denies any other questions or concerns; instructed to call back for any new or worsening symptoms. Patient/Caller agrees with recommended disposition; writer provided warm transfer to [facility] at [facility] for appointment scheduling     Attention Provider: Thank you for allowing me to participate in the care of your patient. The patient was connected to triage in response to information provided to the ECC/PSC. Please do not respond through this encounter as the response is not directed to a shared pool.     Reason for Disposition   [1] Thigh, calf, or ankle swelling AND [2] only 1 side    Protocols used: ANKLE SWELLING-ADULT-

## 2022-07-11 ENCOUNTER — OFFICE VISIT (OUTPATIENT)
Dept: INTERNAL MEDICINE CLINIC | Age: 67
End: 2022-07-11
Payer: MEDICARE

## 2022-07-11 VITALS
SYSTOLIC BLOOD PRESSURE: 122 MMHG | HEIGHT: 62 IN | BODY MASS INDEX: 40.85 KG/M2 | WEIGHT: 222 LBS | DIASTOLIC BLOOD PRESSURE: 80 MMHG | HEART RATE: 84 BPM

## 2022-07-11 DIAGNOSIS — F33.1 MODERATE EPISODE OF RECURRENT MAJOR DEPRESSIVE DISORDER (HCC): ICD-10-CM

## 2022-07-11 DIAGNOSIS — Z96.642 S/P TOTAL LEFT HIP ARTHROPLASTY: ICD-10-CM

## 2022-07-11 DIAGNOSIS — M79.89 RIGHT LEG SWELLING: Primary | ICD-10-CM

## 2022-07-11 DIAGNOSIS — I86.8 VARICOSE VEINS OF OTHER SPECIFIED SITES: ICD-10-CM

## 2022-07-11 DIAGNOSIS — F41.1 GAD (GENERALIZED ANXIETY DISORDER): ICD-10-CM

## 2022-07-11 PROCEDURE — 1036F TOBACCO NON-USER: CPT | Performed by: FAMILY MEDICINE

## 2022-07-11 PROCEDURE — 1123F ACP DISCUSS/DSCN MKR DOCD: CPT | Performed by: FAMILY MEDICINE

## 2022-07-11 PROCEDURE — 99214 OFFICE O/P EST MOD 30 MIN: CPT | Performed by: FAMILY MEDICINE

## 2022-07-11 PROCEDURE — G8399 PT W/DXA RESULTS DOCUMENT: HCPCS | Performed by: FAMILY MEDICINE

## 2022-07-11 PROCEDURE — 1090F PRES/ABSN URINE INCON ASSESS: CPT | Performed by: FAMILY MEDICINE

## 2022-07-11 PROCEDURE — 3017F COLORECTAL CA SCREEN DOC REV: CPT | Performed by: FAMILY MEDICINE

## 2022-07-11 PROCEDURE — G8427 DOCREV CUR MEDS BY ELIG CLIN: HCPCS | Performed by: FAMILY MEDICINE

## 2022-07-11 PROCEDURE — G8417 CALC BMI ABV UP PARAM F/U: HCPCS | Performed by: FAMILY MEDICINE

## 2022-07-11 RX ORDER — BUPROPION HYDROCHLORIDE 150 MG/1
150 TABLET ORAL EVERY MORNING
Qty: 30 TABLET | Refills: 1 | Status: SHIPPED | OUTPATIENT
Start: 2022-07-11 | End: 2022-09-06 | Stop reason: SDUPTHER

## 2022-07-11 RX ORDER — ESCITALOPRAM OXALATE 20 MG/1
10 TABLET ORAL DAILY
Qty: 90 TABLET | Refills: 0
Start: 2022-07-11 | End: 2022-08-22

## 2022-07-11 SDOH — ECONOMIC STABILITY: FOOD INSECURITY: WITHIN THE PAST 12 MONTHS, YOU WORRIED THAT YOUR FOOD WOULD RUN OUT BEFORE YOU GOT MONEY TO BUY MORE.: NEVER TRUE

## 2022-07-11 SDOH — ECONOMIC STABILITY: FOOD INSECURITY: WITHIN THE PAST 12 MONTHS, THE FOOD YOU BOUGHT JUST DIDN'T LAST AND YOU DIDN'T HAVE MONEY TO GET MORE.: NEVER TRUE

## 2022-07-11 ASSESSMENT — PATIENT HEALTH QUESTIONNAIRE - PHQ9
9. THOUGHTS THAT YOU WOULD BE BETTER OFF DEAD, OR OF HURTING YOURSELF: 0
7. TROUBLE CONCENTRATING ON THINGS, SUCH AS READING THE NEWSPAPER OR WATCHING TELEVISION: 2
5. POOR APPETITE OR OVEREATING: 2
SUM OF ALL RESPONSES TO PHQ9 QUESTIONS 1 & 2: 4
SUM OF ALL RESPONSES TO PHQ QUESTIONS 1-9: 15
2. FEELING DOWN, DEPRESSED OR HOPELESS: 2
1. LITTLE INTEREST OR PLEASURE IN DOING THINGS: 2
SUM OF ALL RESPONSES TO PHQ QUESTIONS 1-9: 15
SUM OF ALL RESPONSES TO PHQ QUESTIONS 1-9: 15
4. FEELING TIRED OR HAVING LITTLE ENERGY: 2
SUM OF ALL RESPONSES TO PHQ QUESTIONS 1-9: 15
10. IF YOU CHECKED OFF ANY PROBLEMS, HOW DIFFICULT HAVE THESE PROBLEMS MADE IT FOR YOU TO DO YOUR WORK, TAKE CARE OF THINGS AT HOME, OR GET ALONG WITH OTHER PEOPLE: 2
3. TROUBLE FALLING OR STAYING ASLEEP: 3
8. MOVING OR SPEAKING SO SLOWLY THAT OTHER PEOPLE COULD HAVE NOTICED. OR THE OPPOSITE, BEING SO FIGETY OR RESTLESS THAT YOU HAVE BEEN MOVING AROUND A LOT MORE THAN USUAL: 0
6. FEELING BAD ABOUT YOURSELF - OR THAT YOU ARE A FAILURE OR HAVE LET YOURSELF OR YOUR FAMILY DOWN: 2

## 2022-07-11 ASSESSMENT — SOCIAL DETERMINANTS OF HEALTH (SDOH): HOW HARD IS IT FOR YOU TO PAY FOR THE VERY BASICS LIKE FOOD, HOUSING, MEDICAL CARE, AND HEATING?: NOT HARD AT ALL

## 2022-07-11 NOTE — PROGRESS NOTES
2022    Dajuan Stephens (:  1955) is a 79 y.o. female, here for evaluation of the following chief complaint(s):  Chief Complaint   Patient presents with    Leg Swelling     mostly ankle           ASSESSMENT/PLAN:    1. Right leg swelling  Need to consider a below knee DVT. Other possibilty is venous insufficiency and worsening due to desk job. - VL Extremity Venous Right; Future  - VL Reflux [place] Insufficiency Stdy Right; Future  - Vascular elastic knee highs 20-30 mm Hg     2. Varicose veins of other specified sites   - VL Reflux [place] Insufficiency Stdy Right; Future    3. REBECA (generalized anxiety disorder)  Anxiety is doing fine and she feels she can cut back on med for anxiety because wants something else for depression.   - escitalopram (LEXAPRO) 20 MG tablet; Take 0.5 tablets by mouth daily TAKE ONE TABLET BY MOUTH ONCE NIGHTLY  Dispense: 90 tablet; Refill: 0    4. Moderate episode of recurrent major depressive disorder (HCC)  Discussed medication.   - buPROPion (WELLBUTRIN XL) 150 MG extended release tablet; Take 1 tablet by mouth every morning  Dispense: 30 tablet; Refill: 1    5. S/P total left hip arthroplasty  Noted surgery on chart to keep it up to date. PHQ Scores 2022   PHQ2 Score 4 0 0 0 0 0   PHQ9 Score 15 0 0 0 0 0     Interpretation of Total Score Depression Severity: 1-4 = Minimal depression, 5-9 = Mild depression, 10-14 = Moderate depression, 15-19 = Moderately severe depression, 20-27 = Severe depression      FOLLOW UP:  Keep August visit     HPI     Ankle issues. Not painful. Right ankle and lower leg gets really swollen and makes a lot of blood vessels pop out. Started after left hip replacement but her ankle that swells is right side  Also started working at a desk with legs down sitting for a typical work day.   Tried compression socks and it did not help  She finds that they swell more by Thursday and Friday. Works [employer] - Fri. Not as bad [facility]. She is working in an air conditioned office     Left leg is not swelling. Again noted about 6 weeks after hip replacement. Sometime in March of this year. Surgery 2/14/22      Outpatient Medications Marked as Taking for the 7/11/22 encounter (Office Visit) with Patience Bocanegra MD   Medication Sig Dispense Refill    hydroCHLOROthiazide (HYDRODIURIL) 25 MG tablet Take 1 tablet by mouth every morning 90 tablet 1    losartan (COZAAR) 100 MG tablet Take 1 tablet by mouth daily 90 tablet 1    escitalopram (LEXAPRO) 20 MG tablet TAKE ONE TABLET BY MOUTH ONCE NIGHTLY 90 tablet 2    pantoprazole (PROTONIX) 40 MG tablet TAKE ONE TABLET BY MOUTH DAILY 90 tablet 3    Black Elderberry 50 MG/5ML SYRP Take by mouth daily 1 tablespoon daily         Review of Systems    OBJECTIVE:    Vitals:    07/11/22 0918   BP: 122/80   Pulse: 84   Weight: 222 lb (100.7 kg)   Height: 5' 2\" (1.575 m)       Physical Exam  Vitals reviewed. Constitutional:       Appearance: She is well-developed. Cardiovascular:      Rate and Rhythm: Normal rate and regular rhythm. Heart sounds: Normal heart sounds. Pulmonary:      Effort: Pulmonary effort is normal.      Breath sounds: Normal breath sounds. Musculoskeletal:         General: Swelling (right > left ankle) present. No tenderness. Right lower leg: Swelling present. Edema (1+ ankle) present. Left lower leg: No swelling. Edema (trace ankle) present. Comments: measurements  Mid calf:  Right 40.5 cm;  Left 39.5 cm  Distal calf/prox ankle:  Right 26.5 cm; Left 25.5 cm   Skin:     General: Skin is warm and dry. Coloration: Skin is not pale. Findings: No erythema. Psychiatric:         Behavior: Behavior normal.           An electronic signature was used to authenticate this note.     Gayathri Aguilera MD

## 2022-07-12 PROBLEM — Z96.642 S/P TOTAL LEFT HIP ARTHROPLASTY: Status: ACTIVE | Noted: 2022-02-14

## 2022-07-12 PROBLEM — Z96.642 S/P TOTAL LEFT HIP ARTHROPLASTY: Status: ACTIVE | Noted: 2022-07-12

## 2022-07-14 ENCOUNTER — HOSPITAL ENCOUNTER (OUTPATIENT)
Dept: VASCULAR LAB | Age: 67
Discharge: HOME | End: 2022-07-14
Payer: MEDICARE

## 2022-07-14 DIAGNOSIS — M79.89 RIGHT LEG SWELLING: ICD-10-CM

## 2022-07-14 PROCEDURE — 93971 EXTREMITY STUDY: CPT

## 2022-07-14 NOTE — RESULT ENCOUNTER NOTE
Call patient:  no blood clot. Your veins valves are leaky and that is why you swell in the leg. Support stockings and weight loss is the best treatment.

## 2022-08-22 ENCOUNTER — OFFICE VISIT (OUTPATIENT)
Dept: INTERNAL MEDICINE CLINIC | Age: 67
End: 2022-08-22
Payer: MEDICARE

## 2022-08-22 VITALS
WEIGHT: 224 LBS | DIASTOLIC BLOOD PRESSURE: 74 MMHG | HEIGHT: 62 IN | SYSTOLIC BLOOD PRESSURE: 126 MMHG | BODY MASS INDEX: 41.22 KG/M2 | HEART RATE: 72 BPM

## 2022-08-22 DIAGNOSIS — F41.1 GAD (GENERALIZED ANXIETY DISORDER): ICD-10-CM

## 2022-08-22 DIAGNOSIS — F33.42 RECURRENT MAJOR DEPRESSIVE DISORDER, IN FULL REMISSION (HCC): Primary | ICD-10-CM

## 2022-08-22 PROCEDURE — G8427 DOCREV CUR MEDS BY ELIG CLIN: HCPCS | Performed by: FAMILY MEDICINE

## 2022-08-22 PROCEDURE — 1090F PRES/ABSN URINE INCON ASSESS: CPT | Performed by: FAMILY MEDICINE

## 2022-08-22 PROCEDURE — 3017F COLORECTAL CA SCREEN DOC REV: CPT | Performed by: FAMILY MEDICINE

## 2022-08-22 PROCEDURE — 1123F ACP DISCUSS/DSCN MKR DOCD: CPT | Performed by: FAMILY MEDICINE

## 2022-08-22 PROCEDURE — 99212 OFFICE O/P EST SF 10 MIN: CPT | Performed by: FAMILY MEDICINE

## 2022-08-22 PROCEDURE — G8417 CALC BMI ABV UP PARAM F/U: HCPCS | Performed by: FAMILY MEDICINE

## 2022-08-22 PROCEDURE — 1036F TOBACCO NON-USER: CPT | Performed by: FAMILY MEDICINE

## 2022-08-22 PROCEDURE — G8399 PT W/DXA RESULTS DOCUMENT: HCPCS | Performed by: FAMILY MEDICINE

## 2022-08-22 RX ORDER — ESCITALOPRAM OXALATE 20 MG/1
20 TABLET ORAL DAILY
Qty: 90 TABLET | Refills: 0
Start: 2022-08-22

## 2022-08-22 ASSESSMENT — PATIENT HEALTH QUESTIONNAIRE - PHQ9
SUM OF ALL RESPONSES TO PHQ QUESTIONS 1-9: 1
1. LITTLE INTEREST OR PLEASURE IN DOING THINGS: 1
SUM OF ALL RESPONSES TO PHQ QUESTIONS 1-9: 1
2. FEELING DOWN, DEPRESSED OR HOPELESS: 0
SUM OF ALL RESPONSES TO PHQ9 QUESTIONS 1 & 2: 1
SUM OF ALL RESPONSES TO PHQ QUESTIONS 1-9: 1
SUM OF ALL RESPONSES TO PHQ QUESTIONS 1-9: 1

## 2022-08-22 NOTE — PROGRESS NOTES
2022    Alejandra Arango (:  1955) is a 79 y.o. female, here for evaluation of the following chief complaint(s):  Medication Check      ASSESSMENT/PLAN:    1. Recurrent major depressive disorder, in full remission ([facility] [address]  Doing well with combo of Lexapro and Wellbutrin. PHQ score is 2    2. REBECA (generalized anxiety disorder)  Her anxiety worsened with 10 mg so went back to 20 mg and doing fine.   - escitalopram (LEXAPRO) 20 MG tablet; Take 1 tablet by mouth daily TAKE ONE TABLET BY MOUTH ONCE NIGHTLY  Dispense: 90 tablet; Refill: 0      FOLLOW UP:  Return in about 5 months (around 2023) for fasting labs, med check; Medicare AWV soon:  virtual or in person. HPI  She is doing better. Sees a light at the end of the tunnel. She plans on selling home in about 6 months and moving into senior apartment here in [place]. ([facility]). Outpatient Medications Marked as Taking for the 22 encounter (Office Visit) with Dulce Negrete MD   Medication Sig Dispense Refill    Elastic Bandages & Supports (MEDICAL COMPRESSION STOCKINGS) MISC 20-30 mm Hg knee highs 2 each 2    escitalopram (LEXAPRO) 20 MG tablet Take 0.5 tablets by mouth daily TAKE ONE TABLET BY MOUTH ONCE NIGHTLY 90 tablet 0    buPROPion (WELLBUTRIN XL) 150 MG extended release tablet Take 1 tablet by mouth every morning 30 tablet 1    hydroCHLOROthiazide (HYDRODIURIL) 25 MG tablet Take 1 tablet by mouth every morning 90 tablet 1    losartan (COZAAR) 100 MG tablet Take 1 tablet by mouth daily 90 tablet 1    pantoprazole (PROTONIX) 40 MG tablet TAKE ONE TABLET BY MOUTH DAILY 90 tablet 3    Black Elderberry 50 MG/5ML SYRP Take by mouth daily 1 tablespoon daily         Review of Systems    OBJECTIVE:    Vitals:    22 0909   BP: 126/74   Pulse: 72   Weight: 224 lb (101.6 kg)   Height: 5' 2\" (1.575 m)       Physical Exam  Constitutional:       Appearance: Normal appearance.    Eyes:      General: No scleral icterus. Cardiovascular:      Rate and Rhythm: Normal rate and regular rhythm. Heart sounds: No murmur heard. Pulmonary:      Effort: Pulmonary effort is normal. No respiratory distress. Skin:     Coloration: Skin is not pale. Neurological:      General: No focal deficit present. Mental Status: She is alert and oriented to person, place, and time. Psychiatric:         Mood and Affect: Mood normal.         Behavior: Behavior normal.         An electronic signature was used to authenticate this note.     Gayathri Aguilera MD

## 2022-09-06 ENCOUNTER — TELEPHONE (OUTPATIENT)
Dept: INTERNAL MEDICINE CLINIC | Age: 67
End: 2022-09-06

## 2022-09-06 DIAGNOSIS — F33.1 MODERATE EPISODE OF RECURRENT MAJOR DEPRESSIVE DISORDER (HCC): ICD-10-CM

## 2022-09-06 RX ORDER — BUPROPION HYDROCHLORIDE 150 MG/1
150 TABLET ORAL EVERY MORNING
Qty: 30 TABLET | Refills: 11 | Status: SHIPPED | OUTPATIENT
Start: 2022-09-06

## 2022-10-21 ENCOUNTER — TELEPHONE (OUTPATIENT)
Dept: INTERNAL MEDICINE CLINIC | Age: 67
End: 2022-10-21

## 2022-12-27 ENCOUNTER — TELEPHONE (OUTPATIENT)
Dept: INTERNAL MEDICINE CLINIC | Age: 67
End: 2022-12-27

## 2022-12-27 RX ORDER — LOSARTAN POTASSIUM 100 MG/1
100 TABLET ORAL DAILY
Qty: 90 TABLET | Refills: 1 | Status: SHIPPED | OUTPATIENT
Start: 2022-12-27

## 2022-12-27 RX ORDER — HYDROCHLOROTHIAZIDE 25 MG/1
25 TABLET ORAL EVERY MORNING
Qty: 90 TABLET | Refills: 1 | Status: SHIPPED | OUTPATIENT
Start: 2022-12-27

## 2022-12-27 NOTE — TELEPHONE ENCOUNTER
Patient requests refill for the following:    Hydrochlorothiazide 25 MG tablet    Losartan 100 MG tablet    Please send 90-day supply to [facility] on [address] in [place].

## 2023-01-27 DIAGNOSIS — F41.1 GAD (GENERALIZED ANXIETY DISORDER): ICD-10-CM

## 2023-01-30 ENCOUNTER — TELEPHONE (OUTPATIENT)
Dept: INTERNAL MEDICINE CLINIC | Age: 68
End: 2023-01-30

## 2023-01-30 DIAGNOSIS — F41.1 GAD (GENERALIZED ANXIETY DISORDER): ICD-10-CM

## 2023-01-30 RX ORDER — ESCITALOPRAM OXALATE 20 MG/1
20 TABLET ORAL DAILY
Qty: 90 TABLET | Refills: 0 | Status: SHIPPED | OUTPATIENT
Start: 2023-01-30

## 2023-01-30 RX ORDER — ESCITALOPRAM OXALATE 20 MG/1
TABLET ORAL
Qty: 90 TABLET | Refills: 0 | OUTPATIENT
Start: 2023-01-30

## 2023-01-30 NOTE — TELEPHONE ENCOUNTER
Patient states that her pharmacy needs a new script for escitalopram (LEXAPRO) 20 MG tablet, Pharmacy is correct in epic. Please advise.  Thank you    Last Ov 8/22/22  Next OV: 2/22/23

## 2023-02-22 ENCOUNTER — OFFICE VISIT (OUTPATIENT)
Dept: INTERNAL MEDICINE CLINIC | Age: 68
End: 2023-02-22

## 2023-02-22 VITALS
BODY MASS INDEX: 40.67 KG/M2 | WEIGHT: 221 LBS | SYSTOLIC BLOOD PRESSURE: 118 MMHG | HEIGHT: 62 IN | OXYGEN SATURATION: 93 % | HEART RATE: 85 BPM | DIASTOLIC BLOOD PRESSURE: 80 MMHG

## 2023-02-22 DIAGNOSIS — Z00.00 INITIAL MEDICARE ANNUAL WELLNESS VISIT: ICD-10-CM

## 2023-02-22 DIAGNOSIS — Z12.31 BREAST CANCER SCREENING BY MAMMOGRAM: Primary | ICD-10-CM

## 2023-02-22 DIAGNOSIS — K21.9 GASTROESOPHAGEAL REFLUX DISEASE, UNSPECIFIED WHETHER ESOPHAGITIS PRESENT: ICD-10-CM

## 2023-02-22 DIAGNOSIS — I10 PRIMARY HYPERTENSION: Primary | ICD-10-CM

## 2023-02-22 DIAGNOSIS — Z00.00 MEDICARE ANNUAL WELLNESS VISIT, SUBSEQUENT: ICD-10-CM

## 2023-02-22 DIAGNOSIS — E66.01 OBESITY, CLASS III, BMI 40-49.9 (MORBID OBESITY) (HCC): ICD-10-CM

## 2023-02-22 DIAGNOSIS — F41.1 GAD (GENERALIZED ANXIETY DISORDER): ICD-10-CM

## 2023-02-22 DIAGNOSIS — F33.42 RECURRENT MAJOR DEPRESSIVE DISORDER, IN FULL REMISSION (HCC): ICD-10-CM

## 2023-02-22 PROBLEM — E66.9 OBESITY: Status: ACTIVE | Noted: 2017-02-08

## 2023-02-22 LAB
A/G RATIO: 1.5 (ref 1.1–2.2)
ALBUMIN SERPL-MCNC: 3.8 G/DL (ref 3.4–5)
ALP BLD-CCNC: 99 U/L (ref 40–129)
ALT SERPL-CCNC: 19 U/L (ref 10–40)
ANION GAP SERPL CALCULATED.3IONS-SCNC: 11 MMOL/L (ref 3–16)
AST SERPL-CCNC: 22 U/L (ref 15–37)
BASOPHILS ABSOLUTE: 0 K/UL (ref 0–0.2)
BASOPHILS RELATIVE PERCENT: 0.7 %
BILIRUB SERPL-MCNC: 0.5 MG/DL (ref 0–1)
BUN BLDV-MCNC: 22 MG/DL (ref 7–20)
CALCIUM SERPL-MCNC: 9 MG/DL (ref 8.3–10.6)
CHLORIDE BLD-SCNC: 100 MMOL/L (ref 99–110)
CHOLESTEROL, FASTING: 175 MG/DL (ref 0–199)
CO2: 29 MMOL/L (ref 21–32)
CREAT SERPL-MCNC: 1.1 MG/DL (ref 0.6–1.2)
EOSINOPHILS ABSOLUTE: 0.2 K/UL (ref 0–0.6)
EOSINOPHILS RELATIVE PERCENT: 3.5 %
GFR SERPL CREATININE-BSD FRML MDRD: 55 ML/MIN/{1.73_M2}
GLUCOSE BLD-MCNC: 94 MG/DL (ref 70–99)
HCT VFR BLD CALC: 43.6 % (ref 36–48)
HDLC SERPL-MCNC: 52 MG/DL (ref 40–60)
HEMOGLOBIN: 14.7 G/DL (ref 12–16)
LDL CHOLESTEROL CALCULATED: 105 MG/DL
LYMPHOCYTES ABSOLUTE: 2.2 K/UL (ref 1–5.1)
LYMPHOCYTES RELATIVE PERCENT: 37.9 %
MAGNESIUM: 1.9 MG/DL (ref 1.8–2.4)
MCH RBC QN AUTO: 28.9 PG (ref 26–34)
MCHC RBC AUTO-ENTMCNC: 33.7 G/DL (ref 31–36)
MCV RBC AUTO: 85.6 FL (ref 80–100)
MONOCYTES ABSOLUTE: 0.6 K/UL (ref 0–1.3)
MONOCYTES RELATIVE PERCENT: 10.4 %
NEUTROPHILS ABSOLUTE: 2.8 K/UL (ref 1.7–7.7)
NEUTROPHILS RELATIVE PERCENT: 47.5 %
PDW BLD-RTO: 13.5 % (ref 12.4–15.4)
PLATELET # BLD: 221 K/UL (ref 135–450)
PMV BLD AUTO: 8.4 FL (ref 5–10.5)
POTASSIUM SERPL-SCNC: 3.7 MMOL/L (ref 3.5–5.1)
RBC # BLD: 5.09 M/UL (ref 4–5.2)
SODIUM BLD-SCNC: 140 MMOL/L (ref 136–145)
TOTAL PROTEIN: 6.3 G/DL (ref 6.4–8.2)
TRIGLYCERIDE, FASTING: 90 MG/DL (ref 0–150)
TSH REFLEX: 1.48 UIU/ML (ref 0.27–4.2)
VITAMIN B-12: 527 PG/ML (ref 211–911)
VLDLC SERPL CALC-MCNC: 18 MG/DL
WBC # BLD: 5.9 K/UL (ref 4–11)

## 2023-02-22 RX ORDER — PANTOPRAZOLE SODIUM 40 MG/1
TABLET, DELAYED RELEASE ORAL
Qty: 90 TABLET | Refills: 3 | Status: SHIPPED | OUTPATIENT
Start: 2023-02-22

## 2023-02-22 RX ORDER — LOSARTAN POTASSIUM AND HYDROCHLOROTHIAZIDE 25; 100 MG/1; MG/1
1 TABLET ORAL DAILY
Qty: 90 TABLET | Refills: 3 | Status: SHIPPED | OUTPATIENT
Start: 2023-02-22

## 2023-02-22 RX ORDER — ESCITALOPRAM OXALATE 20 MG/1
20 TABLET ORAL DAILY
Qty: 90 TABLET | Refills: 3 | Status: SHIPPED | OUTPATIENT
Start: 2023-02-22

## 2023-02-22 SDOH — ECONOMIC STABILITY: HOUSING INSECURITY
IN THE LAST 12 MONTHS, WAS THERE A TIME WHEN YOU DID NOT HAVE A STEADY PLACE TO SLEEP OR SLEPT IN A SHELTER (INCLUDING NOW)?: NO

## 2023-02-22 SDOH — ECONOMIC STABILITY: FOOD INSECURITY: WITHIN THE PAST 12 MONTHS, YOU WORRIED THAT YOUR FOOD WOULD RUN OUT BEFORE YOU GOT MONEY TO BUY MORE.: NEVER TRUE

## 2023-02-22 SDOH — ECONOMIC STABILITY: FOOD INSECURITY: WITHIN THE PAST 12 MONTHS, THE FOOD YOU BOUGHT JUST DIDN'T LAST AND YOU DIDN'T HAVE MONEY TO GET MORE.: NEVER TRUE

## 2023-02-22 SDOH — ECONOMIC STABILITY: INCOME INSECURITY: HOW HARD IS IT FOR YOU TO PAY FOR THE VERY BASICS LIKE FOOD, HOUSING, MEDICAL CARE, AND HEATING?: NOT HARD AT ALL

## 2023-02-22 ASSESSMENT — PATIENT HEALTH QUESTIONNAIRE - PHQ9
4. FEELING TIRED OR HAVING LITTLE ENERGY: 0
9. THOUGHTS THAT YOU WOULD BE BETTER OFF DEAD, OR OF HURTING YOURSELF: 0
5. POOR APPETITE OR OVEREATING: 0
6. FEELING BAD ABOUT YOURSELF - OR THAT YOU ARE A FAILURE OR HAVE LET YOURSELF OR YOUR FAMILY DOWN: 0
SUM OF ALL RESPONSES TO PHQ QUESTIONS 1-9: 0
SUM OF ALL RESPONSES TO PHQ QUESTIONS 1-9: 0
10. IF YOU CHECKED OFF ANY PROBLEMS, HOW DIFFICULT HAVE THESE PROBLEMS MADE IT FOR YOU TO DO YOUR WORK, TAKE CARE OF THINGS AT HOME, OR GET ALONG WITH OTHER PEOPLE: 0
3. TROUBLE FALLING OR STAYING ASLEEP: 0
2. FEELING DOWN, DEPRESSED OR HOPELESS: 0
SUM OF ALL RESPONSES TO PHQ QUESTIONS 1-9: 0
7. TROUBLE CONCENTRATING ON THINGS, SUCH AS READING THE NEWSPAPER OR WATCHING TELEVISION: 0
8. MOVING OR SPEAKING SO SLOWLY THAT OTHER PEOPLE COULD HAVE NOTICED. OR THE OPPOSITE, BEING SO FIGETY OR RESTLESS THAT YOU HAVE BEEN MOVING AROUND A LOT MORE THAN USUAL: 0
SUM OF ALL RESPONSES TO PHQ9 QUESTIONS 1 & 2: 0
SUM OF ALL RESPONSES TO PHQ QUESTIONS 1-9: 0
1. LITTLE INTEREST OR PLEASURE IN DOING THINGS: 0

## 2023-02-22 ASSESSMENT — ENCOUNTER SYMPTOMS
SHORTNESS OF BREATH: 0
COUGH: 0
WHEEZING: 0
CHEST TIGHTNESS: 0

## 2023-02-22 ASSESSMENT — LIFESTYLE VARIABLES
HOW OFTEN DO YOU HAVE A DRINK CONTAINING ALCOHOL: 2-4 TIMES A MONTH
HOW MANY STANDARD DRINKS CONTAINING ALCOHOL DO YOU HAVE ON A TYPICAL DAY: 1 OR 2

## 2023-02-22 NOTE — ACP (ADVANCE CARE PLANNING)
Advance Care Planning     Advance Care Planning (ACP) Physician/NP/PA Conversation    Date of Conversation: 2/22/2023  Conducted with: Patient with Decision Making Capacity    Healthcare Decision Maker:      Primary Decision Maker: Nelda Carias - Spouse - [phone]    Click here to complete Healthcare Decision Makers including selection of the Healthcare Decision Maker Relationship (ie \"Primary\")      Care Preferences:    Hospitalization: \"If your health worsens and it becomes clear that your chance of recovery is unlikely, what would be your preference regarding hospitalization? \"  The patient would prefer comfort-focused treatment without hospitalization. Ventilation: \"If you were unable to breath on your own and your chance of recovery was unlikely, what would be your preference about the use of a ventilator (breathing machine) if it was available to you? \"  The patient would desire the use of a ventilator. Resuscitation: \"In the event your heart stopped as a result of an underlying serious health condition, would you want attempts made to restart your heart, or would you prefer a natural death? \"  No, do NOT attempt to resuscitate.     treatment goals    Conversation Outcomes / Follow-Up Plan:    Reviewed DNR/DNI and patient elects Full Code (Attempt Resuscitation)    Length of Voluntary ACP Conversation in minutes:  <16 minutes (Non-Billable)    Dorian Johnston RN

## 2023-02-22 NOTE — PROGRESS NOTES
Medicare Annual Wellness Visit    Avelina White is here for Medicare AWV    Assessment & Plan   Breast cancer screening by mammogram  -     MORE DIGITAL SCREEN UNILATERAL LEFT; Future  Medicare annual wellness visit, subsequent      Recommendations for Preventive Services Due: see orders and patient instructions/AVS.  Recommended screening schedule for the next 5-10 years is provided to the patient in written form: see Patient Instructions/AVS.     1 year     Subjective   The following acute and/or chronic problems were also addressed today:  Healthcare gaps gone over with patient she is going to get shingles and TDaP vaccine at local pharmacy. Patient also due for mammogram, order placed for patient. Patient's complete Health Risk Assessment and screening values have been reviewed and are found in Flowsheets. The following problems were reviewed today and where indicated follow up appointments were made and/or referrals ordered. Positive Risk Factor Screenings with Interventions:               General HRA Questions:  Select all that apply: (!) New or Increased Fatigue, Anger, Stress (daughter and children are living with her.  has started drinking again, alcoholic)    Fatigue Interventions:  See AVS for additional education material, patient has been feeling over whelmed with home situation. Daughter & teen age children are now living with her, and  has started drinking again and is a known alcoholic.      Stress Interventions:  See AVS for additional education material    Anger Interventions:  See above       Weight and Activity:  Physical Activity: Insufficiently Active    Days of Exercise per Week: 1 day    Minutes of Exercise per Session: 20 min     On average, how many days per week do you engage in moderate to strenuous exercise (like a brisk walk)?: 1 day  Have you lost any weight without trying in the past 3 months?: No       Obesity Interventions:  See AVS for additional education material          Dentist Screen:  Have you seen the dentist within the past year?: (!) No (just got dental coverage)    Intervention:  Advised to schedule with their dentist     Vision Screen:  Do you have difficulty driving, watching TV, or doing any of your daily activities because of your eyesight?: (!) Yes (just got perscription renewed is going to go back to check)  Have you had an eye exam within the past year?: Yes  No results found. Interventions:   Patient encouraged to make appointment with their eye specialist                          Objective   There were no vitals filed for this visit. There is no height or weight on file to calculate BMI. Allergies   Allergen Reactions    Codeine      Nausea      Vicodin [Hydrocodone-Acetaminophen]      Itching       Prior to Visit Medications    Medication Sig Taking? Authorizing Provider   escitalopram (LEXAPRO) 20 MG tablet Take 1 tablet by mouth daily TAKE ONE TABLET BY MOUTH ONCE NIGHTLY  Frankie Patricio MD   pantoprazole (PROTONIX) 40 MG tablet TAKE ONE TABLET BY MOUTH DAILY  Frankie Patricio MD   losartan-hydroCHLOROthiazide (HYZAAR) 100-25 MG per tablet Take 1 tablet by mouth daily  Frankie Particio MD   buPROPion (WELLBUTRIN XL) 150 MG extended release tablet Take 1 tablet by mouth every morning  Frankie Patricio MD   Elastic Bandages & Supports (MEDICAL COMPRESSION STOCKINGS) MISC 20-30 mm Hg knee highs  Frankie Patricio MD       [facility] (Including outside providers/suppliers regularly involved in providing care):   Patient Care Team:  Frankie Patricio MD as PCP - General (Family Medicine)  Frankie Patricio MD as PCP - Empaneled Provider     Reviewed and updated this visit:  Allergies  Meds         I, Brenda Canas RN, 2/22/2023, performed the documented evaluation under the direct supervision of the attending physician.       Brenda Canas RN     This encounter was performed under myFrankie MDs, direct supervision, 2/22/2023.

## 2023-02-22 NOTE — PROGRESS NOTES
2023    Lavelel Barcenas (:  1955) is a 79 y.o. female, here for evaluation of the following chief complaint(s):  Check-Up (Fasting today. )        ASSESSMENT/PLAN:    1. Primary hypertension  At goal and meeting medical guidelines. Continue treatment. - losartan-hydroCHLOROthiazide (HYZAAR) 100-25 MG per tablet; Take 1 tablet by mouth daily  Dispense: 90 tablet; Refill: 3  - Comprehensive Metabolic Panel  - Lipid, Fasting    2. Gastroesophageal reflux disease, unspecified whether esophagitis present  On daily PPI and symptoms controlled. - pantoprazole (PROTONIX) 40 MG tablet; TAKE ONE TABLET BY MOUTH DAILY  Dispense: 90 tablet; Refill: 3  - CBC with Auto Differential  - Magnesium  - Vitamin B12    3. Recurrent major depressive disorder, in full remission [facility])  PHQ Scores 2023   PHQ2 Score 0 1 4 0 0 0 0   PHQ9 Score 0 1 15 0 0 0 0     Interpretation of Total Score Depression Severity: 1-4 = Minimal depression, 5-9 = Mild depression, 10-14 = Moderate depression, 15-19 = Moderately severe depression, 20-27 = Severe depression      4. Obesity, Class III, BMI 40-49.9 (morbid obesity) ([facility] [address]  Encouraged healthy diet. 5. REBECA (generalized anxiety disorder)  Stable with medication. - escitalopram (LEXAPRO) 20 MG tablet; Take 1 tablet by mouth daily TAKE ONE TABLET BY MOUTH ONCE NIGHTLY  Dispense: 90 tablet; Refill: 3  - TSH with Reflex      FOLLOW UP:      [facility]  Yearly follow up;  6 months was for her anxiety and depression. Last labs  with est GFR of 55  She has no symptoms. Lipids OK but not tested since ;  A1Cs have been normal    No chest pain. Heartburn doing fine. Mood is fine.     Outpatient Medications Marked as Taking for the 23 encounter (Office Visit) with Ryan Mckay MD   Medication Sig Dispense Refill    escitalopram (LEXAPRO) 20 MG tablet Take 1 tablet by mouth daily TAKE ONE TABLET BY MOUTH ONCE NIGHTLY 90 tablet 0    losartan (COZAAR) 100 MG tablet Take 1 tablet by mouth daily 90 tablet 1    hydroCHLOROthiazide (HYDRODIURIL) 25 MG tablet Take 1 tablet by mouth every morning 90 tablet 1    buPROPion (WELLBUTRIN XL) 150 MG extended release tablet Take 1 tablet by mouth every morning 30 tablet 11    Elastic Bandages & Supports (MEDICAL COMPRESSION STOCKINGS) MISC 20-30 mm Hg knee highs 2 each 2    pantoprazole (PROTONIX) 40 MG tablet TAKE ONE TABLET BY MOUTH DAILY 90 tablet 3       Review of Systems   Respiratory:  Negative for cough, chest tightness, shortness of breath and wheezing. Cardiovascular:  Negative for chest pain, palpitations and leg swelling. Skin:  Negative for rash and wound. Neurological:  Negative for dizziness, syncope, facial asymmetry, speech difficulty, weakness, numbness and headaches. Psychiatric/Behavioral:  Negative for dysphoric mood. OBJECTIVE:    Vitals:    02/22/23 0931   BP: 118/80   Pulse: 85   SpO2: 93%   Weight: 221 lb (100.2 kg)   Height: 5' 2\" (1.575 m)       Physical Exam  Vitals reviewed. Constitutional:       General: She is not in acute distress. Appearance: Normal appearance. She is well-developed. She is not diaphoretic. Eyes:      General: No scleral icterus. Neck:      Thyroid: No thyroid mass or thyromegaly. Vascular: No carotid bruit. Cardiovascular:      Rate and Rhythm: Normal rate and regular rhythm. Heart sounds: Normal heart sounds, S1 normal and S2 normal. No murmur heard. Pulmonary:      Effort: Pulmonary effort is normal. No respiratory distress. Breath sounds: Normal breath sounds. No decreased breath sounds, wheezing, rhonchi or rales. Abdominal:      General: Abdomen is protuberant. Bowel sounds are normal. There is no abdominal bruit. Palpations: Abdomen is soft. There is no hepatomegaly or mass. Tenderness: There is no abdominal tenderness. Musculoskeletal:      Cervical back: Neck supple. Right lower leg: No edema. Left lower leg: No edema. Lymphadenopathy:      Cervical: No cervical adenopathy. Skin:     General: Skin is warm and dry. Coloration: Skin is not pale. Nails: There is no clubbing. Neurological:      Mental Status: She is alert and oriented to person, place, and time. Motor: No tremor or abnormal muscle tone. Coordination: Coordination normal.      Gait: Gait normal.   Psychiatric:         Speech: Speech normal.         Behavior: Behavior normal.         An electronic signature was used to authenticate this note.     Dawood Renee MD

## 2023-02-22 NOTE — PATIENT INSTRUCTIONS
Personalized Preventive Plan for Di Hair - 2/22/2023  Medicare offers a range of preventive health benefits. Some of the tests and screenings are paid in full while other may be subject to a deductible, co-insurance, and/or copay. Some of these benefits include a comprehensive review of your medical history including lifestyle, illnesses that may run in your family, and various assessments and screenings as appropriate. After reviewing your medical record and screening and assessments performed today your provider may have ordered immunizations, labs, imaging, and/or referrals for you. A list of these orders (if applicable) as well as your Preventive Care list are included within your After Visit Summary for your review. Other Preventive Recommendations:    A preventive eye exam performed by an eye specialist is recommended every 1-2 years to screen for glaucoma; cataracts, macular degeneration, and other eye disorders. A preventive dental visit is recommended every 6 months. Try to get at least 150 minutes of exercise per week or 10,000 steps per day on a pedometer . Order or download the FREE \"Exercise & Physical Activity: Your Everyday Guide\" from [employer]. Call 4-502.315.7670 or search [employer] online. You need 0111-6156 mg of calcium and 7754-8618 IU of vitamin D per day. It is possible to meet your calcium requirement with diet alone, but a vitamin D supplement is usually necessary to meet this goal.  When exposed to the sun, use a sunscreen that protects against both UVA and UVB radiation with an SPF of 30 or greater. Reapply every 2 to 3 hours or after sweating, drying off with a towel, or swimming. Always wear a seat belt when traveling in a car. Always wear a helmet when riding a bicycle or motorcycle.

## 2023-02-25 NOTE — RESULT ENCOUNTER NOTE
Overall blood tests are normal. Very mild reduction in kidney function.  We will keep our eye on this.  Avoid regular use of Advil, Motrin, Aleve or the generics of these.  Cholesterol results are acceptable for now.  The LDL is 5 points above goal but not enough to recommend medication.   (ask patient if would like actual copy of results;  offer to put at  or mail results to them)

## 2023-04-28 DIAGNOSIS — F41.1 GAD (GENERALIZED ANXIETY DISORDER): ICD-10-CM

## 2023-04-30 RX ORDER — ESCITALOPRAM OXALATE 20 MG/1
TABLET ORAL
Qty: 90 TABLET | Refills: 3 | Status: SHIPPED | OUTPATIENT
Start: 2023-04-30

## 2023-08-31 DIAGNOSIS — F33.1 MODERATE EPISODE OF RECURRENT MAJOR DEPRESSIVE DISORDER (HCC): ICD-10-CM

## 2023-09-01 RX ORDER — BUPROPION HYDROCHLORIDE 150 MG/1
TABLET ORAL
Qty: 30 TABLET | Refills: 0 | Status: SHIPPED | OUTPATIENT
Start: 2023-09-01 | End: 2023-10-03

## 2023-10-03 DIAGNOSIS — F33.1 MODERATE EPISODE OF RECURRENT MAJOR DEPRESSIVE DISORDER (HCC): ICD-10-CM

## 2023-10-03 RX ORDER — BUPROPION HYDROCHLORIDE 150 MG/1
TABLET ORAL
Qty: 30 TABLET | Refills: 5 | Status: SHIPPED | OUTPATIENT
Start: 2023-10-03

## 2023-10-03 NOTE — TELEPHONE ENCOUNTER
Medication:   Requested Prescriptions     Pending Prescriptions Disp Refills    buPROPion (WELLBUTRIN XL) 150 MG extended release tablet [Pharmacy Med Name: buPROPion HCL  MG TABLET] 30 tablet 0     Sig: TAKE ONE TABLET BY MOUTH EVERY MORNING        Last Filled:  9/1/23    Patient Phone Number: [phone] (home)     Last appt: 2/22/2023   Next appt: Visit date not found    Last OARRS:        No data to display

## 2023-10-23 ENCOUNTER — HOSPITAL ENCOUNTER (OUTPATIENT)
Age: 68
Discharge: HOME | End: 2023-10-23
Payer: MEDICARE

## 2023-10-23 ENCOUNTER — OFFICE VISIT (OUTPATIENT)
Dept: INTERNAL MEDICINE CLINIC | Age: 68
End: 2023-10-23

## 2023-10-23 ENCOUNTER — HOSPITAL ENCOUNTER (OUTPATIENT)
Dept: GENERAL RADIOLOGY | Age: 68
Discharge: HOME | End: 2023-10-23
Payer: MEDICARE

## 2023-10-23 VITALS
OXYGEN SATURATION: 95 % | HEART RATE: 71 BPM | SYSTOLIC BLOOD PRESSURE: 128 MMHG | DIASTOLIC BLOOD PRESSURE: 64 MMHG | HEIGHT: 62 IN | WEIGHT: 216.6 LBS | BODY MASS INDEX: 39.86 KG/M2

## 2023-10-23 DIAGNOSIS — R00.2 PALPITATIONS: ICD-10-CM

## 2023-10-23 DIAGNOSIS — R06.02 SHORTNESS OF BREATH: ICD-10-CM

## 2023-10-23 DIAGNOSIS — R09.89 CHEST RALES: ICD-10-CM

## 2023-10-23 DIAGNOSIS — R07.9 CHEST PAIN, UNSPECIFIED TYPE: ICD-10-CM

## 2023-10-23 DIAGNOSIS — R06.02 SHORTNESS OF BREATH: Primary | ICD-10-CM

## 2023-10-23 DIAGNOSIS — R79.89 ELEVATED BRAIN NATRIURETIC PEPTIDE (BNP) LEVEL: Primary | ICD-10-CM

## 2023-10-23 LAB
ALBUMIN SERPL-MCNC: 3.8 G/DL (ref 3.4–5)
ALBUMIN/GLOB SERPL: 1.5 {RATIO} (ref 1.1–2.2)
ALP SERPL-CCNC: 104 U/L (ref 40–129)
ALT SERPL-CCNC: 17 U/L (ref 10–40)
ANION GAP SERPL CALCULATED.3IONS-SCNC: 12 MMOL/L (ref 3–16)
AST SERPL-CCNC: 19 U/L (ref 15–37)
BASOPHILS # BLD: 0 K/UL (ref 0–0.2)
BASOPHILS NFR BLD: 0.6 %
BILIRUB SERPL-MCNC: 0.4 MG/DL (ref 0–1)
BUN SERPL-MCNC: 21 MG/DL (ref 7–20)
CALCIUM SERPL-MCNC: 9.3 MG/DL (ref 8.3–10.6)
CHLORIDE SERPL-SCNC: 100 MMOL/L (ref 99–110)
CO2 SERPL-SCNC: 28 MMOL/L (ref 21–32)
CREAT SERPL-MCNC: 1 MG/DL (ref 0.6–1.2)
CRP SERPL-MCNC: <3 MG/L (ref 0–5.1)
DEPRECATED RDW RBC AUTO: 13.8 % (ref 12.4–15.4)
EOSINOPHIL # BLD: 0.2 K/UL (ref 0–0.6)
EOSINOPHIL NFR BLD: 2.9 %
GFR SERPLBLD CREATININE-BSD FMLA CKD-EPI: >60 ML/MIN/{1.73_M2}
GLUCOSE SERPL-MCNC: 135 MG/DL (ref 70–99)
HCT VFR BLD AUTO: 45.2 % (ref 36–48)
HGB BLD-MCNC: 15.5 G/DL (ref 12–16)
LYMPHOCYTES # BLD: 2.9 K/UL (ref 1–5.1)
LYMPHOCYTES NFR BLD: 38.9 %
MCH RBC QN AUTO: 29.7 PG (ref 26–34)
MCHC RBC AUTO-ENTMCNC: 34.2 G/DL (ref 31–36)
MCV RBC AUTO: 86.7 FL (ref 80–100)
MONOCYTES # BLD: 0.7 K/UL (ref 0–1.3)
MONOCYTES NFR BLD: 9.2 %
NEUTROPHILS # BLD: 3.6 K/UL (ref 1.7–7.7)
NEUTROPHILS NFR BLD: 48.4 %
NT-PROBNP SERPL-MCNC: 161 PG/ML (ref 0–124)
PLATELET # BLD AUTO: 241 K/UL (ref 135–450)
PMV BLD AUTO: 8.5 FL (ref 5–10.5)
POTASSIUM SERPL-SCNC: 4.1 MMOL/L (ref 3.5–5.1)
PROT SERPL-MCNC: 6.3 G/DL (ref 6.4–8.2)
RBC # BLD AUTO: 5.22 M/UL (ref 4–5.2)
SODIUM SERPL-SCNC: 140 MMOL/L (ref 136–145)
TSH SERPL DL<=0.005 MIU/L-ACNC: 1.76 UIU/ML (ref 0.27–4.2)
WBC # BLD AUTO: 7.5 K/UL (ref 4–11)

## 2023-10-23 PROCEDURE — 71046 X-RAY EXAM CHEST 2 VIEWS: CPT

## 2023-10-23 PROCEDURE — 85025 COMPLETE CBC W/AUTO DIFF WBC: CPT

## 2023-10-23 PROCEDURE — 83880 ASSAY OF NATRIURETIC PEPTIDE: CPT

## 2023-10-23 PROCEDURE — 36415 COLL VENOUS BLD VENIPUNCTURE: CPT

## 2023-10-23 PROCEDURE — 86140 C-REACTIVE PROTEIN: CPT

## 2023-10-23 PROCEDURE — 84443 ASSAY THYROID STIM HORMONE: CPT

## 2023-10-23 PROCEDURE — 80053 COMPREHEN METABOLIC PANEL: CPT

## 2023-10-23 RX ORDER — AZITHROMYCIN 250 MG/1
TABLET, FILM COATED ORAL
Qty: 1 PACKET | Refills: 0 | Status: SHIPPED | OUTPATIENT
Start: 2023-10-23 | End: 2023-10-28

## 2023-10-23 NOTE — RESULT ENCOUNTER NOTE
Labs are normal but one of the heart tests shows a borderline elevation. I would like to have you get an echocardiogram  (heart ultrasound.) I ordered the echo stat so it is not weeks out.

## 2023-10-23 NOTE — RESULT ENCOUNTER NOTE
No definite pneumonia but there is something going on in the left chest.  We will see what blood tests show and also how you do with the antibiotic.

## 2023-10-23 NOTE — PROGRESS NOTES
Heart sounds: Normal heart sounds. Pulmonary:      Effort: Pulmonary effort is normal. No respiratory distress. Breath sounds: No decreased air movement. Examination of the left-upper field reveals rales. Examination of the left-middle field reveals rales. Examination of the left-lower field reveals rales. Rales present. No wheezing or rhonchi. Musculoskeletal:      Cervical back: Neck supple. Lymphadenopathy:      Cervical: No cervical adenopathy. Skin:     General: Skin is warm and dry. Nails: There is no clubbing. An electronic signature was used to authenticate this note.     Rose Burden MD

## 2023-10-23 NOTE — PATIENT INSTRUCTIONS
I sent in a Z-pack = azithromycin to [facility]. Get labs and chest xray done as soon as possible. Concern for pneumonia.     EKG was normal except for an ocassional premature beat

## 2023-10-26 ENCOUNTER — HOSPITAL ENCOUNTER (OUTPATIENT)
Dept: NON INVASIVE DIAGNOSTICS | Age: 68
Discharge: HOME | End: 2023-10-26
Payer: MEDICARE

## 2023-10-26 DIAGNOSIS — R06.02 SHORTNESS OF BREATH: ICD-10-CM

## 2023-10-26 DIAGNOSIS — R79.89 ELEVATED BRAIN NATRIURETIC PEPTIDE (BNP) LEVEL: ICD-10-CM

## 2023-10-26 DIAGNOSIS — R07.9 CHEST PAIN, UNSPECIFIED TYPE: ICD-10-CM

## 2023-10-26 PROCEDURE — 93306 TTE W/DOPPLER COMPLETE: CPT

## 2023-10-30 ENCOUNTER — TELEPHONE (OUTPATIENT)
Dept: CARDIOLOGY CLINIC | Age: 68
End: 2023-10-30

## 2023-10-30 NOTE — TELEPHONE ENCOUNTER
Patient was referred by Pierre Witt to the [facility] any location. Patient has been scheduled with [facility] at [facility] for 11/1/23 at 2:00 pm.  Patient verbalized understanding of appointment instructions. Call complete.

## 2023-10-31 ENCOUNTER — TELEPHONE (OUTPATIENT)
Dept: CARDIOLOGY CLINIC | Age: 68
End: 2023-10-31

## 2023-10-31 NOTE — TELEPHONE ENCOUNTER
Pt had to cancel 11/1 appt @[facility] with LES and needs to be rescheduled.     Please advise date and time  Thank you.      cce ks LES ref mery, poss high output heart failure

## 2023-10-31 NOTE — TELEPHONE ENCOUNTER
Jaden Zhong per  RN to schedule as new patient with Dr Martha Obregon in [place] next week OR could offer 10:00 am on 11/10 with Dr Daralyn Goldmann in [address] [place] [address].

## 2023-11-07 NOTE — PATIENT INSTRUCTIONS
Complete stress test to look at the blood flow of the heart    Wear 48 hour cardiac monitor    Referral sent to sleep medicine for sleep study to evaluate for sleep apnea

## 2023-11-10 ENCOUNTER — OFFICE VISIT (OUTPATIENT)
Dept: CARDIOLOGY CLINIC | Age: 68
End: 2023-11-10

## 2023-11-10 VITALS
OXYGEN SATURATION: 95 % | DIASTOLIC BLOOD PRESSURE: 60 MMHG | HEIGHT: 62 IN | WEIGHT: 219.6 LBS | HEART RATE: 80 BPM | BODY MASS INDEX: 40.41 KG/M2 | SYSTOLIC BLOOD PRESSURE: 110 MMHG

## 2023-11-10 DIAGNOSIS — G47.33 OSA (OBSTRUCTIVE SLEEP APNEA): ICD-10-CM

## 2023-11-10 DIAGNOSIS — R00.2 PALPITATIONS: ICD-10-CM

## 2023-11-10 DIAGNOSIS — I10 PRIMARY HYPERTENSION: ICD-10-CM

## 2023-11-10 DIAGNOSIS — R06.02 SOB (SHORTNESS OF BREATH): Primary | ICD-10-CM

## 2023-11-10 RX ORDER — IBUPROFEN 800 MG/1
800 TABLET ORAL DAILY
COMMUNITY

## 2023-11-10 NOTE — PROGRESS NOTES
[facility]   Cardiac Evaluation      Patient: Authur Duverney  YOB: 1955         Chief Complaint   Patient presents with    New Patient     Heaviness in chest    Pt feel flutter in chest    Shortness of Breath     W/exertion    Dizziness     Occ. Referring provider: Gladys Velasquez MD    History of Present Illness:   Authur Duverney is a 76 y.o. female presenting to me as a new pt from her PCP for SOB, palpitations, and to follow up on recent echo results. Today she arrives with her daughter and is ambulatory. She reports \"fluttering\" in her chest. She has been experiencing SOB with exertion. She has episodes of tachycardia and then an ablation approximately 20 years ago. With regard to medication therapy he/she has been compliant with prescribed regimen and has tolerated therapy to date. Past Medical History:   has a past medical history of Anxiety, Breast cancer ([address]), Colon polyps, GERD (gastroesophageal reflux disease), Hernia, hiatal, Hypertension, Osteoarthritis, Postmastectomy lymphedema syndrome, Reactive depression (situational), Shingles, and Tachycardia. Surgical History:   has a past surgical history that includes Foot fracture surgery (2007 and 2008); Carpal tunnel release (01/01/2005); cervical fusion (01/01/1998); Tubal ligation (01/01/1987); Mastectomy, modified radical (04/27/2010); Breast biopsy; Breast reconstruction (01/01/2011); Colonoscopy (06/01/2012); Foot surgery; ablation of dysrhythmic focus; laparoscopic appendectomy (07/11/2012); right colectomy (07/11/2012); and Total hip arthroplasty (Left, 02/14/2022).      Current Outpatient Medications   Medication Sig Dispense Refill    Apoaequorin (PREVAGEN PO) Take by mouth      ibuprofen (ADVIL;MOTRIN) 800 MG tablet Take 1 tablet by mouth daily      buPROPion (WELLBUTRIN XL) 150 MG extended release tablet TAKE ONE TABLET BY MOUTH EVERY MORNING 30 tablet 5    escitalopram (LEXAPRO) 20 MG tablet
christy Katz Placed 48 hr Biotel heart monitor on pt and pt was given instructions. Pt verbalized understanding.     Device Name: [id number]
thyroid not enlarged. Carotids 2+ without bruits  RESPIRATORY: Normal effort, no rales or rhonchi  CARDIOVASCULAR: Normal PMI, regular rate and rhythm, no murmurs, rub or gallop. No edema. Radial pulses present and equal  CHEST: No scar or masses  ABDOMEN: Normal bowel sounds. No masses or tenderness. No bruit  MUSCULOSKELETAL: No clubbing or cyanosis. Moves all extremities well. Normal gait  SKIN:  Warm and dry. No rashes  NEUROLOGIC: Cranial nerves intact. Alert and oriented  PSYCHIATRIC: Calm affect. Appears to have normal judgement and insight    All testing and labs listed below were personally reviewed by myself. [facility] none on file    ECHO 10/26/2023  Summary   Left ventricular cavity size is normal.   There is mild concentric left ventricular hypertrophy. Left ventricular function is hyperdynamic with ejection fraction estimated   at 65-70%. No regional wall motion abnormalities are noted. Normal diastolic function. SPECT none on file      Assessment/Plan  No diagnosis found. SOB  ***    Palpitations  ***    Hypertension  There were no vitals filed for this visit. Meds ~ hyzaar  Plan ~ ***      Follow up ***    No orders of the defined types were placed in this encounter. Chloe Ernandez MD      Thank you for allowing to me to participate in the care of Kalia Rizo. Scribe's Attestation:  This note was scribed in the presence of Dr. Sweta Mcallister MD by Jelly Evans RN       ***

## 2023-11-27 ENCOUNTER — HOSPITAL ENCOUNTER (OUTPATIENT)
Dept: NON INVASIVE DIAGNOSTICS | Age: 68
Discharge: HOME | End: 2023-11-27
Attending: INTERNAL MEDICINE
Payer: MEDICARE

## 2023-11-27 ENCOUNTER — TELEPHONE (OUTPATIENT)
Dept: CARDIOLOGY CLINIC | Age: 68
End: 2023-11-27

## 2023-11-27 DIAGNOSIS — R06.02 SOB (SHORTNESS OF BREATH): ICD-10-CM

## 2023-11-27 PROCEDURE — A9502 TC99M TETROFOSMIN: HCPCS | Performed by: INTERNAL MEDICINE

## 2023-11-27 PROCEDURE — 6360000002 HC RX W HCPCS: Performed by: INTERNAL MEDICINE

## 2023-11-27 PROCEDURE — 93017 CV STRESS TEST TRACING ONLY: CPT | Performed by: INTERNAL MEDICINE

## 2023-11-27 PROCEDURE — 78452 HT MUSCLE IMAGE SPECT MULT: CPT | Performed by: INTERNAL MEDICINE

## 2023-11-27 PROCEDURE — 3430000000 HC RX DIAGNOSTIC RADIOPHARMACEUTICAL: Performed by: INTERNAL MEDICINE

## 2023-11-27 RX ORDER — REGADENOSON 0.08 MG/ML
0.4 INJECTION, SOLUTION INTRAVENOUS
Status: COMPLETED | OUTPATIENT
Start: 2023-11-27 | End: 2023-11-27

## 2023-11-27 RX ADMIN — TETROFOSMIN 10 MILLICURIE: 1.38 INJECTION, POWDER, LYOPHILIZED, FOR SOLUTION INTRAVENOUS at 08:21

## 2023-11-27 RX ADMIN — REGADENOSON 0.4 MG: 0.08 INJECTION, SOLUTION INTRAVENOUS at 09:29

## 2023-11-27 RX ADMIN — TETROFOSMIN 30 MILLICURIE: 1.38 INJECTION, POWDER, LYOPHILIZED, FOR SOLUTION INTRAVENOUS at 09:34

## 2023-11-27 NOTE — PROGRESS NOTES
Patient instructed on Rolo Protocol Stress Test Procedure including possible side effects and adverse reactions. Verbalizes knowledge and understanding and denies having any questions. Patient unable to reach target heart on treadmill with Rolo Protocol and unable to go any further or any faster per Patient. See new order. Instructed on Lexiscan Stress Test Procedure including possible side effects/ adverse reactions. Patient verbalizes  understanding and denies having any questions . See [id number] [id number] Cardiology

## 2024-02-22 ENCOUNTER — TELEPHONE (OUTPATIENT)
Dept: INTERNAL MEDICINE CLINIC | Age: 69
End: 2024-02-22

## 2024-03-06 DIAGNOSIS — I10 PRIMARY HYPERTENSION: ICD-10-CM

## 2024-03-06 RX ORDER — LOSARTAN POTASSIUM AND HYDROCHLOROTHIAZIDE 25; 100 MG/1; MG/1
1 TABLET ORAL DAILY
Qty: 30 TABLET | Refills: 0 | Status: SHIPPED | OUTPATIENT
Start: 2024-03-06

## 2024-03-06 NOTE — TELEPHONE ENCOUNTER
30 day supply only. Pt was due for follow up around 2/22/24. Visit needed for additional refills.

## 2024-03-13 DIAGNOSIS — K21.9 GASTROESOPHAGEAL REFLUX DISEASE, UNSPECIFIED WHETHER ESOPHAGITIS PRESENT: ICD-10-CM

## 2024-03-13 RX ORDER — PANTOPRAZOLE SODIUM 40 MG/1
TABLET, DELAYED RELEASE ORAL
Qty: 30 TABLET | Refills: 0 | Status: SHIPPED | OUTPATIENT
Start: 2024-03-13

## 2024-04-09 DIAGNOSIS — K21.9 GASTROESOPHAGEAL REFLUX DISEASE, UNSPECIFIED WHETHER ESOPHAGITIS PRESENT: ICD-10-CM

## 2024-04-09 DIAGNOSIS — F33.1 MODERATE EPISODE OF RECURRENT MAJOR DEPRESSIVE DISORDER (HCC): ICD-10-CM

## 2024-04-09 DIAGNOSIS — I10 PRIMARY HYPERTENSION: ICD-10-CM

## 2024-04-09 RX ORDER — BUPROPION HYDROCHLORIDE 150 MG/1
150 TABLET ORAL EVERY MORNING
Qty: 90 TABLET | Refills: 0 | Status: SHIPPED | OUTPATIENT
Start: 2024-04-09

## 2024-04-09 RX ORDER — LOSARTAN POTASSIUM AND HYDROCHLOROTHIAZIDE 25; 100 MG/1; MG/1
1 TABLET ORAL DAILY
Qty: 90 TABLET | Refills: 0 | Status: SHIPPED | OUTPATIENT
Start: 2024-04-09

## 2024-04-09 RX ORDER — LOSARTAN POTASSIUM AND HYDROCHLOROTHIAZIDE 25; 100 MG/1; MG/1
1 TABLET ORAL DAILY
Qty: 30 TABLET | Refills: 0 | Status: CANCELLED | OUTPATIENT
Start: 2024-04-09

## 2024-04-09 RX ORDER — PANTOPRAZOLE SODIUM 40 MG/1
40 TABLET, DELAYED RELEASE ORAL DAILY
Qty: 90 TABLET | Refills: 0 | Status: SHIPPED | OUTPATIENT
Start: 2024-04-09

## 2024-04-09 RX ORDER — BUPROPION HYDROCHLORIDE 150 MG/1
150 TABLET ORAL EVERY MORNING
Qty: 30 TABLET | Refills: 5 | Status: CANCELLED | OUTPATIENT
Start: 2024-04-09

## 2024-04-09 RX ORDER — PANTOPRAZOLE SODIUM 40 MG/1
40 TABLET, DELAYED RELEASE ORAL DAILY
Qty: 30 TABLET | Refills: 0 | Status: CANCELLED | OUTPATIENT
Start: 2024-04-09

## 2024-04-24 DIAGNOSIS — F41.1 GAD (GENERALIZED ANXIETY DISORDER): ICD-10-CM

## 2024-04-25 RX ORDER — ESCITALOPRAM OXALATE 20 MG/1
20 TABLET ORAL NIGHTLY
Qty: 90 TABLET | Refills: 0 | Status: SHIPPED | OUTPATIENT
Start: 2024-04-25

## 2024-06-07 DIAGNOSIS — F33.1 MODERATE EPISODE OF RECURRENT MAJOR DEPRESSIVE DISORDER (HCC): ICD-10-CM

## 2024-06-08 RX ORDER — BUPROPION HYDROCHLORIDE 150 MG/1
150 TABLET ORAL EVERY MORNING
Qty: 90 TABLET | Refills: 0 | Status: SHIPPED | OUTPATIENT
Start: 2024-06-08

## 2024-06-17 SDOH — ECONOMIC STABILITY: INCOME INSECURITY: HOW HARD IS IT FOR YOU TO PAY FOR THE VERY BASICS LIKE FOOD, HOUSING, MEDICAL CARE, AND HEATING?: SOMEWHAT HARD

## 2024-06-17 SDOH — ECONOMIC STABILITY: FOOD INSECURITY: WITHIN THE PAST 12 MONTHS, YOU WORRIED THAT YOUR FOOD WOULD RUN OUT BEFORE YOU GOT MONEY TO BUY MORE.: NEVER TRUE

## 2024-06-17 SDOH — ECONOMIC STABILITY: FOOD INSECURITY: WITHIN THE PAST 12 MONTHS, THE FOOD YOU BOUGHT JUST DIDN'T LAST AND YOU DIDN'T HAVE MONEY TO GET MORE.: NEVER TRUE

## 2024-06-17 SDOH — HEALTH STABILITY: PHYSICAL HEALTH: ON AVERAGE, HOW MANY MINUTES DO YOU ENGAGE IN EXERCISE AT THIS LEVEL?: 20 MIN

## 2024-06-17 SDOH — HEALTH STABILITY: PHYSICAL HEALTH: ON AVERAGE, HOW MANY DAYS PER WEEK DO YOU ENGAGE IN MODERATE TO STRENUOUS EXERCISE (LIKE A BRISK WALK)?: 3 DAYS

## 2024-06-17 ASSESSMENT — PATIENT HEALTH QUESTIONNAIRE - PHQ9
SUM OF ALL RESPONSES TO PHQ QUESTIONS 1-9: 0
2. FEELING DOWN, DEPRESSED OR HOPELESS: NOT AT ALL
SUM OF ALL RESPONSES TO PHQ QUESTIONS 1-9: 0
SUM OF ALL RESPONSES TO PHQ9 QUESTIONS 1 & 2: 0
1. LITTLE INTEREST OR PLEASURE IN DOING THINGS: NOT AT ALL

## 2024-06-17 ASSESSMENT — LIFESTYLE VARIABLES
HOW MANY STANDARD DRINKS CONTAINING ALCOHOL DO YOU HAVE ON A TYPICAL DAY: 1
HOW MANY STANDARD DRINKS CONTAINING ALCOHOL DO YOU HAVE ON A TYPICAL DAY: 1 OR 2
HOW OFTEN DO YOU HAVE SIX OR MORE DRINKS ON ONE OCCASION: 1
HOW OFTEN DO YOU HAVE A DRINK CONTAINING ALCOHOL: 3
HOW OFTEN DO YOU HAVE A DRINK CONTAINING ALCOHOL: 2-4 TIMES A MONTH

## 2024-06-20 ENCOUNTER — OFFICE VISIT (OUTPATIENT)
Dept: INTERNAL MEDICINE CLINIC | Age: 69
End: 2024-06-20

## 2024-06-20 VITALS
OXYGEN SATURATION: 97 % | BODY MASS INDEX: 35.77 KG/M2 | DIASTOLIC BLOOD PRESSURE: 64 MMHG | HEIGHT: 62 IN | HEART RATE: 71 BPM | SYSTOLIC BLOOD PRESSURE: 116 MMHG | WEIGHT: 194.4 LBS

## 2024-06-20 DIAGNOSIS — I10 PRIMARY HYPERTENSION: ICD-10-CM

## 2024-06-20 DIAGNOSIS — R53.83 FATIGUE, UNSPECIFIED TYPE: ICD-10-CM

## 2024-06-20 DIAGNOSIS — Z63.79 STRESS DUE TO ILLNESS OF FAMILY MEMBER: ICD-10-CM

## 2024-06-20 DIAGNOSIS — I47.10 SUPRAVENTRICULAR TACHYCARDIA, UNSPECIFIED (HCC): ICD-10-CM

## 2024-06-20 DIAGNOSIS — E66.01 SEVERE OBESITY (BMI 35.0-39.9) WITH COMORBIDITY (HCC): ICD-10-CM

## 2024-06-20 DIAGNOSIS — Z00.00 MEDICARE ANNUAL WELLNESS VISIT, SUBSEQUENT: Primary | ICD-10-CM

## 2024-06-20 DIAGNOSIS — Z12.31 ENCOUNTER FOR SCREENING MAMMOGRAM FOR BREAST CANCER: ICD-10-CM

## 2024-06-20 DIAGNOSIS — Z85.3 HISTORY OF RIGHT BREAST CANCER: ICD-10-CM

## 2024-06-20 DIAGNOSIS — Z23 NEED FOR PNEUMOCOCCAL 20-VALENT CONJUGATE VACCINATION: ICD-10-CM

## 2024-06-20 DIAGNOSIS — K21.9 GASTROESOPHAGEAL REFLUX DISEASE, UNSPECIFIED WHETHER ESOPHAGITIS PRESENT: ICD-10-CM

## 2024-06-20 DIAGNOSIS — F33.42 RECURRENT MAJOR DEPRESSIVE DISORDER, IN FULL REMISSION (HCC): ICD-10-CM

## 2024-06-20 DIAGNOSIS — Z51.81 MEDICATION MONITORING ENCOUNTER: ICD-10-CM

## 2024-06-20 DIAGNOSIS — Z87.891 PERSONAL HISTORY OF TOBACCO USE: ICD-10-CM

## 2024-06-20 LAB
BASOPHILS # BLD: 0 K/UL (ref 0–0.2)
BASOPHILS NFR BLD: 0.7 %
DEPRECATED RDW RBC AUTO: 13.7 % (ref 12.4–15.4)
EOSINOPHIL # BLD: 0.2 K/UL (ref 0–0.6)
EOSINOPHIL NFR BLD: 3.1 %
HCT VFR BLD AUTO: 44.3 % (ref 36–48)
HGB BLD-MCNC: 15.2 G/DL (ref 12–16)
LYMPHOCYTES # BLD: 2.6 K/UL (ref 1–5.1)
LYMPHOCYTES NFR BLD: 38.4 %
MCH RBC QN AUTO: 29.6 PG (ref 26–34)
MCHC RBC AUTO-ENTMCNC: 34.2 G/DL (ref 31–36)
MCV RBC AUTO: 86.4 FL (ref 80–100)
MONOCYTES # BLD: 0.6 K/UL (ref 0–1.3)
MONOCYTES NFR BLD: 9.5 %
NEUTROPHILS # BLD: 3.2 K/UL (ref 1.7–7.7)
NEUTROPHILS NFR BLD: 48.3 %
PLATELET # BLD AUTO: 271 K/UL (ref 135–450)
PMV BLD AUTO: 8.5 FL (ref 5–10.5)
RBC # BLD AUTO: 5.13 M/UL (ref 4–5.2)
WBC # BLD AUTO: 6.7 K/UL (ref 4–11)

## 2024-06-20 ASSESSMENT — PATIENT HEALTH QUESTIONNAIRE - PHQ9
3. TROUBLE FALLING OR STAYING ASLEEP: NOT AT ALL
8. MOVING OR SPEAKING SO SLOWLY THAT OTHER PEOPLE COULD HAVE NOTICED. OR THE OPPOSITE, BEING SO FIGETY OR RESTLESS THAT YOU HAVE BEEN MOVING AROUND A LOT MORE THAN USUAL: NOT AT ALL
SUM OF ALL RESPONSES TO PHQ9 QUESTIONS 1 & 2: 0
6. FEELING BAD ABOUT YOURSELF - OR THAT YOU ARE A FAILURE OR HAVE LET YOURSELF OR YOUR FAMILY DOWN: NOT AT ALL
5. POOR APPETITE OR OVEREATING: NOT AT ALL
10. IF YOU CHECKED OFF ANY PROBLEMS, HOW DIFFICULT HAVE THESE PROBLEMS MADE IT FOR YOU TO DO YOUR WORK, TAKE CARE OF THINGS AT HOME, OR GET ALONG WITH OTHER PEOPLE: NOT DIFFICULT AT ALL
SUM OF ALL RESPONSES TO PHQ QUESTIONS 1-9: 0
9. THOUGHTS THAT YOU WOULD BE BETTER OFF DEAD, OR OF HURTING YOURSELF: NOT AT ALL
2. FEELING DOWN, DEPRESSED OR HOPELESS: NOT AT ALL
1. LITTLE INTEREST OR PLEASURE IN DOING THINGS: NOT AT ALL
SUM OF ALL RESPONSES TO PHQ QUESTIONS 1-9: 0
SUM OF ALL RESPONSES TO PHQ QUESTIONS 1-9: 0
7. TROUBLE CONCENTRATING ON THINGS, SUCH AS READING THE NEWSPAPER OR WATCHING TELEVISION: NOT AT ALL
4. FEELING TIRED OR HAVING LITTLE ENERGY: NOT AT ALL
SUM OF ALL RESPONSES TO PHQ QUESTIONS 1-9: 0

## 2024-06-20 NOTE — PROGRESS NOTES
Medicare Annual Wellness Visit    Parris Avery is here for Medicare AWV (Fasting )    Assessment & Plan   1. Medicare annual wellness visit, subsequent    2. Personal history of tobacco use  - OR VISIT TO DISCUSS LUNG CA SCREEN W LDCT  - CT Lung Screen (Initial/Annual/Baseline); Future    3. Encounter for screening mammogram for breast cancer  - MORE DIGITAL SCREEN UNILATERAL LEFT; Future    4. History of right breast cancer  - MORE DIGITAL SCREEN UNILATERAL LEFT; Future    5. Severe obesity (BMI 35.0-39.9) with comorbidity (HCC)  She is doing quite well with weight loss.  She is looking using the [employer] weight loss program.  Encouraged to continue.    6. Primary hypertension  At goal and meeting medical guidelines.  Continue treatment.    - Comprehensive Metabolic Panel; Future  - Lipid, Fasting; Future    7. Gastroesophageal reflux disease, unspecified whether esophagitis present  On daily PPI.  Symptoms controlled.  Monitor labs.  - CBC with Auto Differential; Future  - Vitamin B12; Future  - Magnesium; Future    8. Medication monitoring encounter  Labs as ordered under other diagnoses    9. Recurrent major depressive disorder, in full remission (HCC)  PHQ9 score is 0.    10. Fatigue, unspecified type  She thinks it is mostly due to stress of dealing with her alcoholic .   - TSH with Reflex; Future    11. Need for pneumococcal 20-valent conjugate vaccination  - Pneumococcal, PCV20, PREVNAR 20, (age 6w+), IM, PF    12. Stress due to illness of family member  Has been relapse from his chronic alcoholism where he was sober for 30 years.  During a bout of drinking, he fell and now has a traumatic brain injury also.  It has been very stressful for her.    13. Supraventricular tachycardia, unspecified (HCC)  Sees cardiology as recommended.  Last visit November 2023 with Dr. Metzger      Recommendations for Preventive Services Due: see orders and patient instructions/AVS.  Recommended screening schedule for the next

## 2024-06-20 NOTE — PATIENT INSTRUCTIONS
health?  When you are stressed, your body responds as though you are in danger. It makes hormones that speed up your heart, make you breathe faster, and give you a burst of energy. This is called the fight-or-flight stress response. If the stress is over quickly, your body goes back to normal and no harm is done.  But if stress happens too often or lasts too long, it can have bad effects. Long-term stress can make you more likely to get sick, and it can make symptoms of some diseases worse. If you tense up when you are stressed, you may develop neck, shoulder, or low back pain. Stress is linked to high blood pressure and heart disease.  Stress also harms your emotional health. It can make you shultz, tense, or depressed. Your relationships may suffer, and you may not do well at work or school.  What can you do to manage stress?  You can try these things to help manage stress:   Do something active. Exercise or activity can help reduce stress. Walking is a great way to get started. Even everyday activities such as housecleaning or yard work can help.  Try yoga or ham chi. These techniques combine exercise and meditation. You may need some training at first to learn them.  Do something you enjoy. For example, listen to music or go to a movie. Practice your hobby or do volunteer work.  Meditate. This can help you relax, because you are not worrying about what happened before or what may happen in the future.  Do guided imagery. Imagine yourself in any setting that helps you feel calm. You can use online videos, books, or a teacher to guide you.  Do breathing exercises. For example:  From a standing position, bend forward from the waist with your knees slightly bent. Let your arms dangle close to the floor.  Breathe in slowly and deeply as you return to a standing position. Roll up slowly and lift your head last.  Hold your breath for just a few seconds in the standing position.  Breathe out slowly and bend forward from

## 2024-06-21 ENCOUNTER — PATIENT MESSAGE (OUTPATIENT)
Dept: INTERNAL MEDICINE CLINIC | Age: 69
End: 2024-06-21

## 2024-06-21 DIAGNOSIS — E78.9 BORDERLINE HIGH CHOLESTEROL: Primary | ICD-10-CM

## 2024-06-21 DIAGNOSIS — Z91.89 CANDIDATE FOR STATIN THERAPY DUE TO RISK OF FUTURE CARDIOVASCULAR EVENT: ICD-10-CM

## 2024-06-21 LAB
ALBUMIN SERPL-MCNC: 4 G/DL (ref 3.4–5)
ALBUMIN/GLOB SERPL: 1.6 {RATIO} (ref 1.1–2.2)
ALP SERPL-CCNC: 112 U/L (ref 40–129)
ALT SERPL-CCNC: 19 U/L (ref 10–40)
ANION GAP SERPL CALCULATED.3IONS-SCNC: 12 MMOL/L (ref 3–16)
AST SERPL-CCNC: 19 U/L (ref 15–37)
BILIRUB SERPL-MCNC: 0.3 MG/DL (ref 0–1)
BUN SERPL-MCNC: 23 MG/DL (ref 7–20)
CALCIUM SERPL-MCNC: 9.6 MG/DL (ref 8.3–10.6)
CHLORIDE SERPL-SCNC: 102 MMOL/L (ref 99–110)
CHOLEST SERPL-MCNC: 182 MG/DL (ref 0–199)
CO2 SERPL-SCNC: 26 MMOL/L (ref 21–32)
CREAT SERPL-MCNC: 0.9 MG/DL (ref 0.6–1.2)
GFR SERPLBLD CREATININE-BSD FMLA CKD-EPI: 69 ML/MIN/{1.73_M2}
GLUCOSE SERPL-MCNC: 80 MG/DL (ref 70–99)
LDL CHOLESTEROL: 106 MG/DL
MAGNESIUM SERPL-MCNC: 2.2 MG/DL (ref 1.8–2.4)
POTASSIUM SERPL-SCNC: 4.4 MMOL/L (ref 3.5–5.1)
PROT SERPL-MCNC: 6.5 G/DL (ref 6.4–8.2)
SODIUM SERPL-SCNC: 140 MMOL/L (ref 136–145)
TRIGL SERPL-MCNC: 113 MG/DL (ref 0–150)
TSH SERPL DL<=0.005 MIU/L-ACNC: 2.06 UIU/ML (ref 0.27–4.2)
VIT B12 SERPL-MCNC: 853 PG/ML (ref 211–911)
VLDLC SERPL CALC-MCNC: 23 MG/DL

## 2024-06-21 NOTE — RESULT ENCOUNTER NOTE
The 10-year ASCVD risk score (Keyanna DE, et al., 2019) is: 9.2%    Values used to calculate the score:      Age: 69 years      Sex: Female      Is Non- : No      Diabetic: No      Tobacco smoker: No      Systolic Blood Pressure: 116 mmHg      Is BP treated: Yes      HDL Cholesterol: 53 mg/dL      Total Cholesterol: 182 mg/dL

## 2024-06-22 PROBLEM — F33.9 RECURRENT MAJOR DEPRESSIVE DISORDER (HCC): Status: ACTIVE | Noted: 2022-07-11

## 2024-06-23 PROBLEM — Z91.89 CANDIDATE FOR STATIN THERAPY DUE TO RISK OF FUTURE CARDIOVASCULAR EVENT: Status: ACTIVE | Noted: 2024-06-23

## 2024-06-23 PROBLEM — E78.9 BORDERLINE HIGH CHOLESTEROL: Status: ACTIVE | Noted: 2024-06-23

## 2024-06-23 RX ORDER — ATORVASTATIN CALCIUM 20 MG/1
20 TABLET, FILM COATED ORAL DAILY
Qty: 90 TABLET | Refills: 1 | Status: SHIPPED | OUTPATIENT
Start: 2024-06-23

## 2024-07-07 DIAGNOSIS — F33.1 MODERATE EPISODE OF RECURRENT MAJOR DEPRESSIVE DISORDER (HCC): ICD-10-CM

## 2024-07-07 DIAGNOSIS — I10 PRIMARY HYPERTENSION: ICD-10-CM

## 2024-07-07 DIAGNOSIS — K21.9 GASTROESOPHAGEAL REFLUX DISEASE, UNSPECIFIED WHETHER ESOPHAGITIS PRESENT: ICD-10-CM

## 2024-07-07 RX ORDER — LOSARTAN POTASSIUM AND HYDROCHLOROTHIAZIDE 25; 100 MG/1; MG/1
1 TABLET ORAL DAILY
Qty: 90 TABLET | Refills: 3 | Status: SHIPPED | OUTPATIENT
Start: 2024-07-07

## 2024-07-07 RX ORDER — BUPROPION HYDROCHLORIDE 150 MG/1
150 TABLET ORAL EVERY MORNING
Qty: 90 TABLET | Refills: 3 | Status: SHIPPED | OUTPATIENT
Start: 2024-07-07

## 2024-07-07 RX ORDER — PANTOPRAZOLE SODIUM 40 MG/1
40 TABLET, DELAYED RELEASE ORAL DAILY
Qty: 90 TABLET | Refills: 3 | Status: SHIPPED | OUTPATIENT
Start: 2024-07-07

## 2024-07-15 ENCOUNTER — HOSPITAL ENCOUNTER (OUTPATIENT)
Dept: CT IMAGING | Age: 69
Discharge: HOME | End: 2024-07-15
Attending: FAMILY MEDICINE
Payer: MEDICARE

## 2024-07-15 ENCOUNTER — HOSPITAL ENCOUNTER (OUTPATIENT)
Dept: WOMENS IMAGING | Age: 69
Discharge: HOME | End: 2024-07-15
Attending: FAMILY MEDICINE
Payer: MEDICARE

## 2024-07-15 VITALS — BODY MASS INDEX: 34.96 KG/M2 | HEIGHT: 62 IN | WEIGHT: 190 LBS

## 2024-07-15 DIAGNOSIS — Z87.891 PERSONAL HISTORY OF TOBACCO USE: ICD-10-CM

## 2024-07-15 DIAGNOSIS — Z85.3 HISTORY OF RIGHT BREAST CANCER: ICD-10-CM

## 2024-07-15 DIAGNOSIS — Z12.31 ENCOUNTER FOR SCREENING MAMMOGRAM FOR BREAST CANCER: ICD-10-CM

## 2024-07-15 PROCEDURE — 77063 BREAST TOMOSYNTHESIS BI: CPT

## 2024-07-15 PROCEDURE — 71271 CT THORAX LUNG CANCER SCR C-: CPT

## 2024-07-20 DIAGNOSIS — F41.1 GAD (GENERALIZED ANXIETY DISORDER): ICD-10-CM

## 2024-07-21 RX ORDER — ESCITALOPRAM OXALATE 20 MG/1
20 TABLET ORAL NIGHTLY
Qty: 90 TABLET | Refills: 0 | Status: SHIPPED | OUTPATIENT
Start: 2024-07-21

## 2024-10-18 DIAGNOSIS — F41.1 GAD (GENERALIZED ANXIETY DISORDER): ICD-10-CM

## 2024-10-19 RX ORDER — ESCITALOPRAM OXALATE 20 MG/1
20 TABLET ORAL NIGHTLY
Qty: 90 TABLET | Refills: 2 | Status: SHIPPED | OUTPATIENT
Start: 2024-10-19

## 2024-12-20 DIAGNOSIS — E78.9 BORDERLINE HIGH CHOLESTEROL: ICD-10-CM

## 2024-12-20 DIAGNOSIS — Z91.89 CANDIDATE FOR STATIN THERAPY DUE TO RISK OF FUTURE CARDIOVASCULAR EVENT: ICD-10-CM

## 2024-12-20 RX ORDER — ATORVASTATIN CALCIUM 20 MG/1
20 TABLET, FILM COATED ORAL DAILY
Qty: 90 TABLET | Refills: 1 | Status: SHIPPED | OUTPATIENT
Start: 2024-12-20

## 2025-06-20 DIAGNOSIS — Z91.89 CANDIDATE FOR STATIN THERAPY DUE TO RISK OF FUTURE CARDIOVASCULAR EVENT: ICD-10-CM

## 2025-06-20 DIAGNOSIS — E78.9 BORDERLINE HIGH CHOLESTEROL: ICD-10-CM

## 2025-06-20 RX ORDER — ATORVASTATIN CALCIUM 20 MG/1
20 TABLET, FILM COATED ORAL DAILY
Qty: 90 TABLET | Refills: 1 | Status: SHIPPED | OUTPATIENT
Start: 2025-06-20

## 2025-06-20 NOTE — TELEPHONE ENCOUNTER
Medication:   Requested Prescriptions     Pending Prescriptions Disp Refills    atorvastatin (LIPITOR) 20 MG tablet [Pharmacy Med Name: ATORVASTATIN 20 MG TABLET] 90 tablet 1     Sig: TAKE 1 TABLET BY MOUTH DAILY        Last Filled:      Patient Phone Number: [phone] (home)     Last appt: 6/20/2024   Next appt: Visit date not found    Last OARRS:        No data to display

## 2025-06-23 ENCOUNTER — CLINICAL DOCUMENTATION (OUTPATIENT)
Dept: CASE MANAGEMENT | Age: 70
End: 2025-06-23

## 2025-06-23 NOTE — PROGRESS NOTES
Patient meets lung screening criteria. Patient due for annual CT Lung Screening. First reminder letter mailed. If ordered, Patient may call [phone] to schedule.     Lung Screen Criteria  Age 50-80  Current smoker or quit within the last 15 years  Has => 20 pack year history.    Future Appointments   Date Time Provider Department Center   7/2/2025  9:30 AM Tuyet Jamil MD [facility] DEP       Active Lung Screen order on chart: Yes

## 2025-06-29 ASSESSMENT — PATIENT HEALTH QUESTIONNAIRE - PHQ9
10. IF YOU CHECKED OFF ANY PROBLEMS, HOW DIFFICULT HAVE THESE PROBLEMS MADE IT FOR YOU TO DO YOUR WORK, TAKE CARE OF THINGS AT HOME, OR GET ALONG WITH OTHER PEOPLE: NOT DIFFICULT AT ALL
4. FEELING TIRED OR HAVING LITTLE ENERGY: SEVERAL DAYS
1. LITTLE INTEREST OR PLEASURE IN DOING THINGS: NOT AT ALL
9. THOUGHTS THAT YOU WOULD BE BETTER OFF DEAD, OR OF HURTING YOURSELF: NOT AT ALL
9. THOUGHTS THAT YOU WOULD BE BETTER OFF DEAD, OR OF HURTING YOURSELF: NOT AT ALL
8. MOVING OR SPEAKING SO SLOWLY THAT OTHER PEOPLE COULD HAVE NOTICED. OR THE OPPOSITE - BEING SO FIDGETY OR RESTLESS THAT YOU HAVE BEEN MOVING AROUND A LOT MORE THAN USUAL: NOT AT ALL
10. IF YOU CHECKED OFF ANY PROBLEMS, HOW DIFFICULT HAVE THESE PROBLEMS MADE IT FOR YOU TO DO YOUR WORK, TAKE CARE OF THINGS AT HOME, OR GET ALONG WITH OTHER PEOPLE: NOT DIFFICULT AT ALL
5. POOR APPETITE OR OVEREATING: NOT AT ALL
SUM OF ALL RESPONSES TO PHQ QUESTIONS 1-9: 2
SUM OF ALL RESPONSES TO PHQ QUESTIONS 1-9: 2
3. TROUBLE FALLING OR STAYING ASLEEP: SEVERAL DAYS
2. FEELING DOWN, DEPRESSED OR HOPELESS: NOT AT ALL
7. TROUBLE CONCENTRATING ON THINGS, SUCH AS READING THE NEWSPAPER OR WATCHING TELEVISION: NOT AT ALL
6. FEELING BAD ABOUT YOURSELF - OR THAT YOU ARE A FAILURE OR HAVE LET YOURSELF OR YOUR FAMILY DOWN: NOT AT ALL
1. LITTLE INTEREST OR PLEASURE IN DOING THINGS: NOT AT ALL
6. FEELING BAD ABOUT YOURSELF - OR THAT YOU ARE A FAILURE OR HAVE LET YOURSELF OR YOUR FAMILY DOWN: NOT AT ALL
2. FEELING DOWN, DEPRESSED OR HOPELESS: NOT AT ALL
7. TROUBLE CONCENTRATING ON THINGS, SUCH AS READING THE NEWSPAPER OR WATCHING TELEVISION: NOT AT ALL
SUM OF ALL RESPONSES TO PHQ QUESTIONS 1-9: 2
SUM OF ALL RESPONSES TO PHQ QUESTIONS 1-9: 2
5. POOR APPETITE OR OVEREATING: NOT AT ALL
SUM OF ALL RESPONSES TO PHQ QUESTIONS 1-9: 2
3. TROUBLE FALLING OR STAYING ASLEEP: SEVERAL DAYS
8. MOVING OR SPEAKING SO SLOWLY THAT OTHER PEOPLE COULD HAVE NOTICED. OR THE OPPOSITE, BEING SO FIGETY OR RESTLESS THAT YOU HAVE BEEN MOVING AROUND A LOT MORE THAN USUAL: NOT AT ALL
4. FEELING TIRED OR HAVING LITTLE ENERGY: SEVERAL DAYS

## 2025-07-02 ENCOUNTER — OFFICE VISIT (OUTPATIENT)
Dept: INTERNAL MEDICINE CLINIC | Age: 70
End: 2025-07-02
Payer: MEDICARE

## 2025-07-02 ENCOUNTER — OFFICE VISIT (OUTPATIENT)
Dept: INTERNAL MEDICINE CLINIC | Age: 70
End: 2025-07-02

## 2025-07-02 VITALS
DIASTOLIC BLOOD PRESSURE: 84 MMHG | SYSTOLIC BLOOD PRESSURE: 122 MMHG | HEART RATE: 68 BPM | WEIGHT: 190 LBS | OXYGEN SATURATION: 99 % | BODY MASS INDEX: 34.96 KG/M2 | HEIGHT: 62 IN

## 2025-07-02 VITALS
OXYGEN SATURATION: 99 % | BODY MASS INDEX: 34.96 KG/M2 | HEART RATE: 68 BPM | DIASTOLIC BLOOD PRESSURE: 84 MMHG | WEIGHT: 190 LBS | SYSTOLIC BLOOD PRESSURE: 122 MMHG | HEIGHT: 62 IN

## 2025-07-02 DIAGNOSIS — Z79.899 HIGH RISK MEDICATION USE: ICD-10-CM

## 2025-07-02 DIAGNOSIS — K21.9 GASTROESOPHAGEAL REFLUX DISEASE, UNSPECIFIED WHETHER ESOPHAGITIS PRESENT: ICD-10-CM

## 2025-07-02 DIAGNOSIS — Z91.89 CANDIDATE FOR STATIN THERAPY DUE TO RISK OF FUTURE CARDIOVASCULAR EVENT: ICD-10-CM

## 2025-07-02 DIAGNOSIS — I10 PRIMARY HYPERTENSION: Primary | ICD-10-CM

## 2025-07-02 DIAGNOSIS — Z12.31 ENCOUNTER FOR SCREENING MAMMOGRAM FOR BREAST CANCER: ICD-10-CM

## 2025-07-02 DIAGNOSIS — F41.1 GAD (GENERALIZED ANXIETY DISORDER): ICD-10-CM

## 2025-07-02 DIAGNOSIS — F33.42 RECURRENT MAJOR DEPRESSIVE DISORDER, IN FULL REMISSION: ICD-10-CM

## 2025-07-02 DIAGNOSIS — E78.9 BORDERLINE HIGH CHOLESTEROL: ICD-10-CM

## 2025-07-02 DIAGNOSIS — Z00.00 MEDICARE ANNUAL WELLNESS VISIT, SUBSEQUENT: Primary | ICD-10-CM

## 2025-07-02 DIAGNOSIS — Z78.0 POSTMENOPAUSAL: ICD-10-CM

## 2025-07-02 LAB
ALBUMIN SERPL-MCNC: 3.9 G/DL (ref 3.4–5)
ALBUMIN/GLOB SERPL: 1.9 {RATIO} (ref 1.1–2.2)
ALP SERPL-CCNC: 92 U/L (ref 40–129)
ALT SERPL-CCNC: 20 U/L (ref 10–40)
ANION GAP SERPL CALCULATED.3IONS-SCNC: 11 MMOL/L (ref 3–16)
AST SERPL-CCNC: 21 U/L (ref 15–37)
BASOPHILS # BLD: 0.1 K/UL (ref 0–0.2)
BASOPHILS NFR BLD: 1 %
BILIRUB SERPL-MCNC: 0.5 MG/DL (ref 0–1)
BUN SERPL-MCNC: 25 MG/DL (ref 7–20)
CALCIUM SERPL-MCNC: 9.2 MG/DL (ref 8.3–10.6)
CHLORIDE SERPL-SCNC: 103 MMOL/L (ref 99–110)
CHOLEST SERPL-MCNC: 210 MG/DL (ref 0–199)
CO2 SERPL-SCNC: 26 MMOL/L (ref 21–32)
CREAT SERPL-MCNC: 0.9 MG/DL (ref 0.6–1.2)
DEPRECATED RDW RBC AUTO: 13 % (ref 12.4–15.4)
EOSINOPHIL # BLD: 0.6 K/UL (ref 0–0.6)
EOSINOPHIL NFR BLD: 11.9 %
GFR SERPLBLD CREATININE-BSD FMLA CKD-EPI: 69 ML/MIN/{1.73_M2}
GLUCOSE SERPL-MCNC: 98 MG/DL (ref 70–99)
HCT VFR BLD AUTO: 45.4 % (ref 36–48)
HDLC SERPL-MCNC: 57 MG/DL (ref 40–60)
HGB BLD-MCNC: 15.4 G/DL (ref 12–16)
LDL CHOLESTEROL: 134 MG/DL
LYMPHOCYTES # BLD: 1.8 K/UL (ref 1–5.1)
LYMPHOCYTES NFR BLD: 33.7 %
MAGNESIUM SERPL-MCNC: 2.01 MG/DL (ref 1.8–2.4)
MCH RBC QN AUTO: 29.9 PG (ref 26–34)
MCHC RBC AUTO-ENTMCNC: 34 G/DL (ref 31–36)
MCV RBC AUTO: 88 FL (ref 80–100)
MONOCYTES # BLD: 0.5 K/UL (ref 0–1.3)
MONOCYTES NFR BLD: 10 %
NEUTROPHILS # BLD: 2.4 K/UL (ref 1.7–7.7)
NEUTROPHILS NFR BLD: 43.4 %
PLATELET # BLD AUTO: 225 K/UL (ref 135–450)
PMV BLD AUTO: 8.4 FL (ref 5–10.5)
POTASSIUM SERPL-SCNC: 4.2 MMOL/L (ref 3.5–5.1)
PROT SERPL-MCNC: 6 G/DL (ref 6.4–8.2)
RBC # BLD AUTO: 5.16 M/UL (ref 4–5.2)
SODIUM SERPL-SCNC: 140 MMOL/L (ref 136–145)
TRIGL SERPL-MCNC: 96 MG/DL (ref 0–150)
TSH SERPL DL<=0.005 MIU/L-ACNC: 1.83 UIU/ML (ref 0.27–4.2)
VIT B12 SERPL-MCNC: 606 PG/ML (ref 211–911)
VLDLC SERPL CALC-MCNC: 19 MG/DL
WBC # BLD AUTO: 5.4 K/UL (ref 4–11)

## 2025-07-02 PROCEDURE — G0439 PPPS, SUBSEQ VISIT: HCPCS | Performed by: FAMILY MEDICINE

## 2025-07-02 RX ORDER — BUPROPION HYDROCHLORIDE 150 MG/1
150 TABLET ORAL EVERY MORNING
Qty: 90 TABLET | Refills: 3 | Status: SHIPPED | OUTPATIENT
Start: 2025-07-02

## 2025-07-02 RX ORDER — LOSARTAN POTASSIUM AND HYDROCHLOROTHIAZIDE 25; 100 MG/1; MG/1
1 TABLET ORAL DAILY
Qty: 90 TABLET | Refills: 3 | Status: SHIPPED | OUTPATIENT
Start: 2025-07-02

## 2025-07-02 RX ORDER — PANTOPRAZOLE SODIUM 40 MG/1
40 TABLET, DELAYED RELEASE ORAL DAILY
Qty: 90 TABLET | Refills: 3 | Status: SHIPPED | OUTPATIENT
Start: 2025-07-02

## 2025-07-02 RX ORDER — ESCITALOPRAM OXALATE 20 MG/1
20 TABLET ORAL NIGHTLY
Qty: 90 TABLET | Refills: 3 | Status: SHIPPED | OUTPATIENT
Start: 2025-07-02

## 2025-07-02 SDOH — ECONOMIC STABILITY: FOOD INSECURITY: WITHIN THE PAST 12 MONTHS, YOU WORRIED THAT YOUR FOOD WOULD RUN OUT BEFORE YOU GOT MONEY TO BUY MORE.: NEVER TRUE

## 2025-07-02 SDOH — ECONOMIC STABILITY: FOOD INSECURITY: WITHIN THE PAST 12 MONTHS, THE FOOD YOU BOUGHT JUST DIDN'T LAST AND YOU DIDN'T HAVE MONEY TO GET MORE.: NEVER TRUE

## 2025-07-02 ASSESSMENT — PATIENT HEALTH QUESTIONNAIRE - PHQ9
5. POOR APPETITE OR OVEREATING: NOT AT ALL
SUM OF ALL RESPONSES TO PHQ QUESTIONS 1-9: 2
4. FEELING TIRED OR HAVING LITTLE ENERGY: SEVERAL DAYS
3. TROUBLE FALLING OR STAYING ASLEEP: SEVERAL DAYS
SUM OF ALL RESPONSES TO PHQ QUESTIONS 1-9: 2
SUM OF ALL RESPONSES TO PHQ QUESTIONS 1-9: 2
2. FEELING DOWN, DEPRESSED OR HOPELESS: NOT AT ALL
6. FEELING BAD ABOUT YOURSELF - OR THAT YOU ARE A FAILURE OR HAVE LET YOURSELF OR YOUR FAMILY DOWN: NOT AT ALL
9. THOUGHTS THAT YOU WOULD BE BETTER OFF DEAD, OR OF HURTING YOURSELF: NOT AT ALL
8. MOVING OR SPEAKING SO SLOWLY THAT OTHER PEOPLE COULD HAVE NOTICED. OR THE OPPOSITE, BEING SO FIGETY OR RESTLESS THAT YOU HAVE BEEN MOVING AROUND A LOT MORE THAN USUAL: NOT AT ALL
7. TROUBLE CONCENTRATING ON THINGS, SUCH AS READING THE NEWSPAPER OR WATCHING TELEVISION: NOT AT ALL
10. IF YOU CHECKED OFF ANY PROBLEMS, HOW DIFFICULT HAVE THESE PROBLEMS MADE IT FOR YOU TO DO YOUR WORK, TAKE CARE OF THINGS AT HOME, OR GET ALONG WITH OTHER PEOPLE: NOT DIFFICULT AT ALL
SUM OF ALL RESPONSES TO PHQ QUESTIONS 1-9: 2
1. LITTLE INTEREST OR PLEASURE IN DOING THINGS: NOT AT ALL

## 2025-07-02 ASSESSMENT — LIFESTYLE VARIABLES
HOW MANY STANDARD DRINKS CONTAINING ALCOHOL DO YOU HAVE ON A TYPICAL DAY: 1 OR 2
HOW OFTEN DO YOU HAVE A DRINK CONTAINING ALCOHOL: 2-4 TIMES A MONTH

## 2025-07-02 NOTE — PROGRESS NOTES
2025    Parris Avery (:  1955) is a 70 y.o. female, here for evaluation of the following chief complaint(s):  1 Year Follow Up (Fasting today. )      ASSESSMENT/PLAN:    1. Primary hypertension  Chronic condition:  At goal and meeting medical guidelines.  Continue treatment.    - losartan-hydroCHLOROthiazide (HYZAAR) 100-25 MG per tablet; Take 1 tablet by mouth daily  Dispense: 90 tablet; Refill: 3    2. Gastroesophageal reflux disease, unspecified whether esophagitis present  Symptoms controlled on daily PPI.    - pantoprazole (PROTONIX) 40 MG tablet; Take 1 tablet by mouth daily  Dispense: 90 tablet; Refill: 3  - CBC with Auto Differential; Future  - Comprehensive Metabolic Panel; Future  - Magnesium; Future  - Vitamin B12; Future    3. Borderline high cholesterol  Lab Results   Component Value Date     (H) 2024     Now on statin due to ASCVD risk.    - Comprehensive Metabolic Panel; Future  - Lipid, Fasting; Future    4. Candidate for statin therapy due to risk of future cardiovascular event  The 10-year ASCVD risk score (Keyanna DE, et al., 2019) is: 11.3%    Values used to calculate the score:      Age: 70 years      Sex: Female      Is Non- : No      Diabetic: No      Tobacco smoker: No      Systolic Blood Pressure: 122 mmHg      Is BP treated: Yes      HDL Cholesterol: 53 mg/dL      Total Cholesterol: 182 mg/dL    Seems to be tolerating her statin at this time.    - Lipid, Fasting; Future    5. Recurrent major depressive disorder, in full remission  PHQ9 score is 2 and PHQ2 score is 0.    - buPROPion (WELLBUTRIN XL) 150 MG extended release tablet; Take 1 tablet by mouth every morning  Dispense: 90 tablet; Refill: 3  - TSH reflex to FT4; Future    6. Postmenopausal  - DEXA BONE DENSITY AXIAL SKELETON; Future    7. Encounter for screening mammogram for breast cancer  - MORE DIGITAL SCREEN UNILATERAL LEFT; Future    8. REBECA (generalized anxiety

## 2025-07-02 NOTE — PATIENT INSTRUCTIONS
disease, occurs when a substance called plaque builds up in the vessels that supply oxygen-rich blood to your heart muscle. This can narrow the blood vessels and reduce blood flow. A heart attack happens when blood flow is completely blocked. A high-fat diet, smoking, and other factors increase the risk of heart disease.  Your doctor has found that you have a chance of having heart disease. A heart-healthy lifestyle can help keep your heart healthy and prevent heart disease. This lifestyle includes eating healthy, being active, staying at a weight that's healthy for you, and not smoking or using tobacco. It also includes taking medicines as directed, managing other health conditions, and trying to get a healthy amount of sleep.  Follow-up care is a key part of your treatment and safety. Be sure to make and go to all appointments, and call your doctor if you are having problems. It's also a good idea to know your test results and keep a list of the medicines you take.  How can you care for yourself at home?  Diet    Use less salt when you cook and eat. This helps lower your blood pressure. Taste food before salting. Add only a little salt when you think you need it. With time, your taste buds will adjust to less salt.     Eat fewer snack items, fast foods, canned soups, and other high-salt, high-fat, processed foods.     Read food labels and try to avoid saturated and trans fats. They increase your risk of heart disease by raising cholesterol levels.     Limit the amount of solid fat--butter, margarine, and shortening--you eat. Use olive, peanut, or canola oil when you cook. Bake, broil, and steam foods instead of frying them.     Eat a variety of fruit and vegetables every day. Dark green, deep orange, red, or yellow fruits and vegetables are especially good for you. Examples include spinach, carrots, peaches, and berries.     Foods high in fiber can reduce your cholesterol and provide important vitamins and
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
## 2025-07-03 ENCOUNTER — RESULTS FOLLOW-UP (OUTPATIENT)
Dept: INTERNAL MEDICINE CLINIC | Age: 70
End: 2025-07-03

## 2025-07-03 DIAGNOSIS — K21.9 GASTROESOPHAGEAL REFLUX DISEASE, UNSPECIFIED WHETHER ESOPHAGITIS PRESENT: ICD-10-CM

## 2025-07-03 DIAGNOSIS — I10 PRIMARY HYPERTENSION: ICD-10-CM

## 2025-07-03 LAB
EST. AVERAGE GLUCOSE BLD GHB EST-MCNC: 102.5 MG/DL
HBA1C MFR BLD: 5.2 %

## 2025-07-03 RX ORDER — PANTOPRAZOLE SODIUM 40 MG/1
40 TABLET, DELAYED RELEASE ORAL DAILY
Qty: 90 TABLET | Refills: 3 | OUTPATIENT
Start: 2025-07-03

## 2025-07-03 RX ORDER — LOSARTAN POTASSIUM AND HYDROCHLOROTHIAZIDE 25; 100 MG/1; MG/1
1 TABLET ORAL DAILY
Qty: 90 TABLET | Refills: 3 | OUTPATIENT
Start: 2025-07-03

## 2025-08-18 ENCOUNTER — TELEPHONE (OUTPATIENT)
Dept: CASE MANAGEMENT | Age: 70
End: 2025-08-18